# Patient Record
Sex: FEMALE | Race: WHITE | Employment: UNEMPLOYED | URBAN - METROPOLITAN AREA
[De-identification: names, ages, dates, MRNs, and addresses within clinical notes are randomized per-mention and may not be internally consistent; named-entity substitution may affect disease eponyms.]

---

## 2020-02-27 ENCOUNTER — OFFICE VISIT (OUTPATIENT)
Dept: ORTHOPEDIC SURGERY | Age: 75
End: 2020-02-27
Payer: MEDICARE

## 2020-02-27 VITALS — TEMPERATURE: 98.4 F | BODY MASS INDEX: 27.88 KG/M2 | HEIGHT: 60 IN | WEIGHT: 142 LBS

## 2020-02-27 PROCEDURE — 4040F PNEUMOC VAC/ADMIN/RCVD: CPT | Performed by: ORTHOPAEDIC SURGERY

## 2020-02-27 PROCEDURE — 1123F ACP DISCUSS/DSCN MKR DOCD: CPT | Performed by: ORTHOPAEDIC SURGERY

## 2020-02-27 PROCEDURE — G8484 FLU IMMUNIZE NO ADMIN: HCPCS | Performed by: ORTHOPAEDIC SURGERY

## 2020-02-27 PROCEDURE — G8417 CALC BMI ABV UP PARAM F/U: HCPCS | Performed by: ORTHOPAEDIC SURGERY

## 2020-02-27 PROCEDURE — G8400 PT W/DXA NO RESULTS DOC: HCPCS | Performed by: ORTHOPAEDIC SURGERY

## 2020-02-27 PROCEDURE — G8427 DOCREV CUR MEDS BY ELIG CLIN: HCPCS | Performed by: ORTHOPAEDIC SURGERY

## 2020-02-27 PROCEDURE — 99214 OFFICE O/P EST MOD 30 MIN: CPT | Performed by: ORTHOPAEDIC SURGERY

## 2020-02-27 PROCEDURE — 3017F COLORECTAL CA SCREEN DOC REV: CPT | Performed by: ORTHOPAEDIC SURGERY

## 2020-02-27 PROCEDURE — 1036F TOBACCO NON-USER: CPT | Performed by: ORTHOPAEDIC SURGERY

## 2020-02-27 PROCEDURE — 1090F PRES/ABSN URINE INCON ASSESS: CPT | Performed by: ORTHOPAEDIC SURGERY

## 2020-02-27 RX ORDER — LIDOCAINE 4 G/G
1 PATCH TOPICAL DAILY
COMMUNITY
End: 2020-07-24 | Stop reason: ALTCHOICE

## 2020-02-27 RX ORDER — TRAMADOL HYDROCHLORIDE 50 MG/1
50 TABLET ORAL EVERY 6 HOURS PRN
Status: ON HOLD | COMMUNITY
Start: 2011-06-27 | End: 2020-07-29 | Stop reason: HOSPADM

## 2020-02-27 RX ORDER — FAMOTIDINE 40 MG/1
40 TABLET, FILM COATED ORAL DAILY
COMMUNITY

## 2020-02-27 RX ORDER — EPINEPHRINE 0.3 MG/.3ML
0.3 INJECTION SUBCUTANEOUS
COMMUNITY

## 2020-02-27 RX ORDER — ROSUVASTATIN CALCIUM 10 MG/1
10 TABLET, COATED ORAL NIGHTLY
COMMUNITY
Start: 2020-02-14

## 2020-02-27 RX ORDER — OMEPRAZOLE 20 MG/1
20 CAPSULE, DELAYED RELEASE ORAL 2 TIMES DAILY
COMMUNITY

## 2020-02-27 RX ORDER — NITROFURANTOIN 25; 75 MG/1; MG/1
CAPSULE ORAL
COMMUNITY
End: 2020-07-24 | Stop reason: ALTCHOICE

## 2020-02-27 NOTE — LETTER
[facility]  Surgery Scheduling Form:  [facility]    DEMOGRAPHICS:                                                                                                                Patient Name:  Madhav Carrion  Patient :     Patient YO#:    Patient Phone:  [phone] (home)                               Patient Address:  [address] [place] [id number]    PCP:  [address] [facility] [address]:   Payor/Plan Subscr  Sex Relation Sub. Ins. ID Effective Group Num   1. MEDICARE - METerrance Macadam  Female  [id number] 1/1/15                                    [address] [facility] 1945 Female  [id number] 1/1/15 PLAN  F                                    [address]     DIAGNOSIS & PROCEDURE:                                                                                              Diagnosis:    Arthritis Left shoulder            M19.012                                                           Diagnosis Orders   1. Left shoulder pain, unspecified chronicity  XR SHOULDER LEFT (MIN 2 VIEWS)     Operation:  left reverse total shoulder replacement   [id number]  Location:  [place]  Surgeon:  Mendoza Burns. Cindy Rooney MD    SCHEDULING INFORMATION:                                                                                         .  Surgeon's Scheduling Instruction:       Requested Date:    OR Time:   Patient Arrival Time:      OR Time Required:  90 minutes  Anesthesia:  General  Equipment:  Tornier  Mini C-Arm:  No   Standard C-Arm:  No  Status:  Outpatient in bed  PAT Required:  Yes  Comments:  ALLERGIES:Bactrim [sulfamethoxazole-trimethoprim]; Gabapentin; Lipitor [atorvastatin]; Lisinopril; and Percocet [oxycodone-acetaminophen]                     Joceline Duke.  Tash Jim MD      20 11:39 AM  BILLING INFORMATION: Procedure:       CPT Code Modifier  With Jose Taylor, [address] [place] [address]                          H&P AT:    PCP  ___XX___               URGENT CARE_________       Roxy Schulte           Left   TOTAL SHOULDER REPLACEMENT                           1945                         PHYSICIANS ORDERS    HEIGHT:  Ht Readings from Last 1 Encounters:   20 5' (1.524 m)                    WEIGHT:  Wt Readings from Last 1 Encounters:   20 142 lb (64.4 kg)       ALLERGIES:Bactrim [sulfamethoxazole-trimethoprim]; Gabapentin; Lipitor [atorvastatin]; Lisinopril; and Percocet [oxycodone-acetaminophen]                                                     SUR-29                           ____________________________________________________________________  PRE-OP ORDERS:  CBC WITH DIFFERENTIAL                                                TYPE AND SCREEN                                                            HgB A1C                                                                               EKG                                                                                        NASAL CULUTRE MRSA  UAR/if positive repeat UAR on admission  BMP  Albumin and Prealbumin  VITAMIN D LEVELS  COAG PROFILE  SED RATE  PT/OT EVAL AND TEACHING  INSTRUCT PT TO STOP ALL NSAIDS, ASPIRIN, BLOOD THINNERS 7 DAYS PRIOR SURGERY  DAY OF SURGERY  CEFAZOLIN 1 GM IVPB; IF PATIENT WEIGHS > 80 KG AND SERUM CREATININE <2.5 mg/dl, GIVE 2 GM DOSE WITHIN 1 HOUR OF INCISION.   IF THE PRE-OP NASAL CULTURE FOR MRSA WAS POSITIVE:   REPEAT NASAL SWAB ON ADMISSION AND ADMINISTER VANCOMYCIN 2 GM IVPB, REDUCE THE DOSE OF VANCOMYCIN  MG IVPB IF PT < 55 KG OR SERUM CREATININE > 2mg/dl;also to get Cefazolin 2 GM or wt based  All patients will receive preop Cefazolin 2 GM or wt based  CELEBREX  200 MG  ORALLY  DAY OF SURGERY  Roxicodone 10MG  ORALLY DAY OF SURGERY          OTHER ORDERS:     PHYSICIAN SIGNATURE:   20 11:39 AM

## 2020-02-28 ENCOUNTER — TELEPHONE (OUTPATIENT)
Dept: ORTHOPEDIC SURGERY | Age: 75
End: 2020-02-28

## 2020-03-07 NOTE — PROGRESS NOTES
This dictation was done with [employer] dictation and may contain mechanical errors related to translation. Temperature 98.4 °F (36.9 °C), height 5' (1.524 m), weight 142 lb (64.4 kg), not currently breastfeeding.
Historical Provider, MD   ticagrelor (BRILINTA) 90 MG TABS tablet Take by mouth Yes Historical Provider, MD   traMADol (ULTRAM) 50 MG tablet tramadol 50 mg tablet   1-2 tablets every 6 hrs Yes Historical Provider, MD   KLOR-CON M20 20 MEQ tablet Take 20 mEq by mouth daily  Yes Historical Provider, MD   metoprolol (LOPRESSOR) 100 MG tablet Take 100 mg by mouth 2 times daily  Yes Historical Provider, MD   cephALEXin (KEFLEX) 500 MG capsule Take 500 mg by mouth daily Frequent UTI Yes Historical Provider, MD   levothyroxine (SYNTHROID) 25 MCG tablet Take 25 mcg by mouth daily  Yes Historical Provider, MD   ursodiol (ACTIGALL) 300 MG capsule Take 300 mg by mouth daily (before lunch)  Yes Historical Provider, MD   zolpidem (AMBIEN) 10 MG tablet Take by mouth nightly  Yes Historical Provider, MD   penicillin v potassium (VEETID) 500 MG tablet Take 500 mg by mouth 4 times daily Yes Historical Provider, MD   ferrous sulfate 325 (65 FE) MG tablet Take 325 mg by mouth 2 times daily  Yes Historical Provider, MD   Cholecalciferol (VITAMIN D) 2000 UNITS CAPS capsule Take by mouth daily  Yes Historical Provider, MD   Multiple Vitamins-Minerals (MULTIVITAMIN PO) Take by mouth Yes Historical Provider, MD   Multiple Vitamins-Minerals (PRORENAL QD PO) Take by mouth Yes Historical Provider, MD   B Complex-C (SUPER B COMPLEX/VITAMIN C PO) Take by mouth Yes Historical Provider, MD   Ascorbic Acid (VITAMIN C) 500 MG CAPS Take by mouth every other day  Yes Historical Provider, MD   hydrALAZINE (APRESOLINE) 25 MG tablet Take 25 mg by mouth 2 times daily   Historical Provider, MD   losartan (COZAAR) 25 MG tablet Take 25 mg by mouth nightly   Historical Provider, MD   prasugrel (EFFIENT) 10 MG TABS Take 10 mg by mouth daily  Historical Provider, MD   chlorthalidone (HYGROTON) 25 MG tablet Take 25 mg by mouth daily  Historical Provider, MD   docusate sodium (COLACE) 100 MG capsule Take 100 mg by mouth 2 times daily  Historical Provider, MD

## 2020-04-01 ENCOUNTER — TELEPHONE (OUTPATIENT)
Dept: ORTHOPEDIC SURGERY | Age: 75
End: 2020-04-01

## 2020-06-09 ENCOUNTER — TELEPHONE (OUTPATIENT)
Dept: ORTHOPEDIC SURGERY | Age: 75
End: 2020-06-09

## 2020-06-23 ENCOUNTER — PREP FOR PROCEDURE (OUTPATIENT)
Dept: ORTHOPEDIC SURGERY | Age: 75
End: 2020-06-23

## 2020-07-01 ENCOUNTER — HOSPITAL ENCOUNTER (OUTPATIENT)
Dept: CT IMAGING | Age: 75
Discharge: HOME | End: 2020-07-01
Payer: MEDICARE

## 2020-07-01 PROCEDURE — 73200 CT UPPER EXTREMITY W/O DYE: CPT

## 2020-07-21 ENCOUNTER — PREP FOR PROCEDURE (OUTPATIENT)
Dept: ORTHOPEDICS UNIT | Age: 75
End: 2020-07-21

## 2020-07-21 RX ORDER — CELECOXIB 200 MG/1
200 CAPSULE ORAL ONCE
Status: CANCELLED | OUTPATIENT
Start: 2020-07-21 | End: 2020-07-21

## 2020-07-21 NOTE — Clinical Note
Results for Jakob Callahan (MRN [id number]) as of 7/27/2020 15:37    7/23/2020 12:29  Vit D, 25-Hydroxy: 190.4 (H)

## 2020-07-22 RX ORDER — OXYCODONE HYDROCHLORIDE 5 MG/1
10 TABLET ORAL ONCE
Status: CANCELLED | OUTPATIENT
Start: 2020-07-22 | End: 2020-07-22

## 2020-07-23 ENCOUNTER — OFFICE VISIT (OUTPATIENT)
Dept: PRIMARY CARE CLINIC | Age: 75
End: 2020-07-23
Payer: MEDICARE

## 2020-07-23 ENCOUNTER — OFFICE VISIT (OUTPATIENT)
Dept: ORTHOPEDIC SURGERY | Age: 75
End: 2020-07-23
Payer: MEDICARE

## 2020-07-23 VITALS — WEIGHT: 140 LBS | TEMPERATURE: 98.2 F | HEIGHT: 60 IN | BODY MASS INDEX: 27.48 KG/M2

## 2020-07-23 DIAGNOSIS — M19.012 ARTHRITIS OF LEFT SHOULDER REGION: ICD-10-CM

## 2020-07-23 LAB
ABO/RH: NORMAL
ALBUMIN SERPL-MCNC: 4.3 G/DL (ref 3.4–5)
ANION GAP SERPL CALCULATED.3IONS-SCNC: 11 MMOL/L (ref 3–16)
ANTIBODY SCREEN: NORMAL
APTT: 26.8 SEC (ref 24.2–36.2)
BASOPHILS ABSOLUTE: 0.1 K/UL (ref 0–0.2)
BASOPHILS RELATIVE PERCENT: 1.1 %
BUN BLDV-MCNC: 26 MG/DL (ref 7–20)
CALCIUM SERPL-MCNC: 9.5 MG/DL (ref 8.3–10.6)
CHLORIDE BLD-SCNC: 104 MMOL/L (ref 99–110)
CO2: 24 MMOL/L (ref 21–32)
CREAT SERPL-MCNC: 1.2 MG/DL (ref 0.6–1.2)
EOSINOPHILS ABSOLUTE: 0.2 K/UL (ref 0–0.6)
EOSINOPHILS RELATIVE PERCENT: 3.9 %
ESTIMATED AVERAGE GLUCOSE: 111.2 MG/DL
GFR AFRICAN AMERICAN: 53
GFR NON-AFRICAN AMERICAN: 44
GLUCOSE BLD-MCNC: 92 MG/DL (ref 70–99)
HBA1C MFR BLD: 5.5 %
HCT VFR BLD CALC: 35.1 % (ref 36–48)
HEMOGLOBIN: 12 G/DL (ref 12–16)
INR BLD: 0.92 (ref 0.86–1.14)
LYMPHOCYTES ABSOLUTE: 1.3 K/UL (ref 1–5.1)
LYMPHOCYTES RELATIVE PERCENT: 20.6 %
MCH RBC QN AUTO: 34.2 PG (ref 26–34)
MCHC RBC AUTO-ENTMCNC: 34.1 G/DL (ref 31–36)
MCV RBC AUTO: 100 FL (ref 80–100)
MONOCYTES ABSOLUTE: 0.5 K/UL (ref 0–1.3)
MONOCYTES RELATIVE PERCENT: 8.6 %
NEUTROPHILS ABSOLUTE: 4.2 K/UL (ref 1.7–7.7)
NEUTROPHILS RELATIVE PERCENT: 65.8 %
PDW BLD-RTO: 13.5 % (ref 12.4–15.4)
PLATELET # BLD: 180 K/UL (ref 135–450)
PMV BLD AUTO: 9.5 FL (ref 5–10.5)
POTASSIUM SERPL-SCNC: 5.1 MMOL/L (ref 3.5–5.1)
PREALBUMIN: 26.5 MG/DL (ref 20–40)
PROTHROMBIN TIME: 10.7 SEC (ref 10–13.2)
RBC # BLD: 3.51 M/UL (ref 4–5.2)
SODIUM BLD-SCNC: 139 MMOL/L (ref 136–145)
VITAMIN D 25-HYDROXY: 190.4 NG/ML
WBC # BLD: 6.4 K/UL (ref 4–11)

## 2020-07-23 PROCEDURE — 1090F PRES/ABSN URINE INCON ASSESS: CPT | Performed by: ORTHOPAEDIC SURGERY

## 2020-07-23 PROCEDURE — G8400 PT W/DXA NO RESULTS DOC: HCPCS | Performed by: ORTHOPAEDIC SURGERY

## 2020-07-23 PROCEDURE — 99211 OFF/OP EST MAY X REQ PHY/QHP: CPT | Performed by: NURSE PRACTITIONER

## 2020-07-23 PROCEDURE — G8428 CUR MEDS NOT DOCUMENT: HCPCS | Performed by: NURSE PRACTITIONER

## 2020-07-23 PROCEDURE — 1036F TOBACCO NON-USER: CPT | Performed by: ORTHOPAEDIC SURGERY

## 2020-07-23 PROCEDURE — 99214 OFFICE O/P EST MOD 30 MIN: CPT | Performed by: ORTHOPAEDIC SURGERY

## 2020-07-23 PROCEDURE — G8417 CALC BMI ABV UP PARAM F/U: HCPCS | Performed by: NURSE PRACTITIONER

## 2020-07-23 PROCEDURE — 4040F PNEUMOC VAC/ADMIN/RCVD: CPT | Performed by: ORTHOPAEDIC SURGERY

## 2020-07-23 PROCEDURE — G8417 CALC BMI ABV UP PARAM F/U: HCPCS | Performed by: ORTHOPAEDIC SURGERY

## 2020-07-23 PROCEDURE — 1123F ACP DISCUSS/DSCN MKR DOCD: CPT | Performed by: ORTHOPAEDIC SURGERY

## 2020-07-23 PROCEDURE — 3017F COLORECTAL CA SCREEN DOC REV: CPT | Performed by: ORTHOPAEDIC SURGERY

## 2020-07-23 PROCEDURE — G8427 DOCREV CUR MEDS BY ELIG CLIN: HCPCS | Performed by: ORTHOPAEDIC SURGERY

## 2020-07-23 NOTE — PROGRESS NOTES
Patient presented to [facility] drive up clinic for preop testing. Patient was swabbed and given information advising them to remain isolated until procedure date.

## 2020-07-23 NOTE — PROGRESS NOTES
This dictation was done with [employer] dictation and may contain mechanical errors related to translation. Temperature 98.2 °F (36.8 °C), height 5' (1.524 m), weight 140 lb (63.5 kg), not currently breastfeeding.

## 2020-07-24 ENCOUNTER — TELEPHONE (OUTPATIENT)
Dept: ORTHOPEDIC SURGERY | Age: 75
End: 2020-07-24

## 2020-07-24 LAB
SARS-COV-2: NOT DETECTED
SEDIMENTATION RATE, ERYTHROCYTE: 26 MM/HR (ref 0–30)
SOURCE: NORMAL

## 2020-07-24 RX ORDER — RANOLAZINE 500 MG/1
500 TABLET, EXTENDED RELEASE ORAL 2 TIMES DAILY
COMMUNITY
Start: 2020-05-20

## 2020-07-24 RX ORDER — KETOTIFEN FUMARATE 0.35 MG/ML
1 SOLUTION/ DROPS OPHTHALMIC PRN
COMMUNITY

## 2020-07-24 RX ORDER — CHLORHEXIDINE GLUCONATE 4 G/100ML
SOLUTION TOPICAL
Qty: 1 BOTTLE | Refills: 0 | Status: SHIPPED | OUTPATIENT
Start: 2020-07-24 | End: 2020-08-07

## 2020-07-24 NOTE — PROGRESS NOTES
DR. Sim Bowels OFFICE NOTIFIED THAT PATIENT WAS NEVER GIVEN HIBICLENS TO DO PRIOR TO HER SURGERY 3 DAYS ON OPERATIVE SITE

## 2020-07-24 NOTE — PROGRESS NOTES
Preoperative Screening for Elective Surgery/Invasive Procedures While COVID-19 present in the community     Have you tested positive or have been told to self-isolate for COVID-19 like symptoms within the past 28 days? NO   Do you currently have any of the following symptoms? o Fever >100.0 F or 99.9 F in immunocompromised patients? NO  o New onset cough, shortness of breath or difficulty breathing? NO  o New onset sore throat, myalgia (muscle aches and pains), headache, loss of taste/smell or diarrhea? NO   Have you had a potential exposure to COVID-19 within the past 14 days by:  o Close contact with a confirmed case? NO  o Close contact with a healthcare worker,  or essential infrastructure worker (grocery store, [employer], gas station, public utilities or transportation)? YES-[facility]  o Do you reside in a congregate setting such as; skilled nursing facility, adult home, correctional facility, homeless shelter or other institutional setting? NO  o Have you had recent travel to a known COVID-19 hotspot? Indicate if the patient has a positive screen by answering yes to one or more of the above questions. Patients who test positive or screen positive prior to surgery or on the day of surgery should be evaluated in conjunction with the surgeon/proceduralist/anesthesiologist to determine the urgency of the procedure.

## 2020-07-24 NOTE — PROGRESS NOTES
DR. Abhinav Negro OFFICE NOTIFIED OF LABs not done on 7/23/2020 [facility] AND SED RATE

## 2020-07-24 NOTE — PROGRESS NOTES
PRE-OP INSTRUCTIONS     · Do not eat or drink anything after 12:00 midnight prior to surgery. This includes water, chewing gum, mints and ice chips. You may brush your teeth and gargle the morning of surgery but DO  NOT SWALLOW THE WATER. Take the following medications with a small sip of water on the morning of surgery: Follow your MD/Surgeons pre-procedure instructions regarding your medications    · If you use an inhaler, please use it the morning of surgery and bring with you to hospital.    · You may be asked to stop blood thinners such as:  Coumadin, Plavix, Fragmin and lovenox. Please check with your doctor before stopping these or any other medications. · Aspirin, ibuprofen, advil and naproxen, any anti-inflammatory products should be stopped for a week prior to your surgery. MAY TAKE TYLENOL    · Do not smoke and do not drink any alcoholic beverages 24 hours prior to your surgery. · Please do not wear any jewelry or body piercings on the day of surgery. · Please wear something simple, loose fitting clothing to the hospital.  Do not wear any make-up(including eye make-up) or nail polish on your fingers and toes. · As part of our patient safety program to minimize surgical infections, we ask you to do the followin. Please notify your surgeon if you develop any illness between now                          and the day of your surgery. This includes a cough, cold, fever, sore                       throat, nausea, vomiting, diarrhea, etc.  Also please notify your                                  surgeon if you experience dizziness, shortness of breath or                       Blurred vision between now and the time of your surgery.                    2.  Please notify your surgeon of any open or redden areas that may                        look infected                     3.  DO NOT shave your operative site 96 hours(four days) prior to

## 2020-07-27 NOTE — PROGRESS NOTES
Patient is a 69-year-old female who is here for preoperative discussion of left reverse total shoulder arthroplasty. Date of surgery scheduled is 7/29/2020. This patient is about 3 and half years status post contralateral right reverse total shoulder arthroplasty and has done very well with this. Her left shoulder now has significant pain loss of range of motion and x-ray and other advanced imaging shows degenerative arthritis of the glenohumeral joint along with chronic rotator cuff tear. Patient has no history of injury or surgery to the left shoulder. The patient is not diabetic and is not taking any hormone replacement medications. Patient is not allergic to metal has no neck problems but does have a family history of degenerative arthritis. Patient does have a history of 2 strokes and is currently on a blood thinner however this patient is stated that she has stopped that for her surgical procedure. Patient does not smoke however she does take tramadol which we did suggest that she cut back or eliminate that at least preoperatively. Patient is here to discuss shoulder replacement due to high degree of pain and loss of range of motion of the left shoulder. Patient Active Problem List   Diagnosis    Arthritis of right shoulder region    Biceps tendonitis on right    S/p reverse total shoulder arthroplasty     Prior to Visit Medications    Medication Sig Taking?  Authorizing Provider   aspirin 81 MG tablet Take 81 mg by mouth daily Yes Historical Provider, MD   omeprazole (PRILOSEC) 20 MG delayed release capsule Take 20 mg by mouth 2 times daily  Yes Historical Provider, MD   famotidine (PEPCID) 40 MG tablet Take 40 mg by mouth daily Yes Historical Provider, MD   EPINEPHrine (EPIPEN) 0.3 MG/0.3ML SOAJ injection Inject 0.3 mg into the muscle Yes Historical Provider, MD   rosuvastatin (CRESTOR) 10 MG tablet Take 10 mg by mouth nightly  Yes Historical Provider, MD   ticagrelor (BRILINTA) 90 MG TABS tablet Take 60 mg by mouth 2 times daily  Yes Historical Provider, MD   traMADol (ULTRAM) 50 MG tablet Take 50 mg by mouth every 6 hours as needed.   Yes Historical Provider, MD   KLOR-CON M20 20 MEQ tablet Take 20 mEq by mouth daily  Yes Historical Provider, MD   metoprolol (LOPRESSOR) 100 MG tablet Take 100 mg by mouth 2 times daily  Yes Historical Provider, MD   cephALEXin (KEFLEX) 500 MG capsule Take 500 mg by mouth daily Frequent UTI Yes Historical Provider, MD   hydrALAZINE (APRESOLINE) 25 MG tablet Take 25 mg by mouth 2 times daily  Yes Historical Provider, MD   levothyroxine (SYNTHROID) 25 MCG tablet Take 25 mcg by mouth daily  Yes Historical Provider, MD   losartan (COZAAR) 25 MG tablet Take 25 mg by mouth nightly  Yes Historical Provider, MD   ursodiol (ACTIGALL) 300 MG capsule Take 300 mg by mouth 3 times daily (before meals)  Yes Historical Provider, MD   zolpidem (AMBIEN) 10 MG tablet Take by mouth nightly  Yes Historical Provider, MD   penicillin v potassium (VEETID) 500 MG tablet Take 500 mg by mouth 4 times daily Yes Historical Provider, MD   ferrous sulfate 325 (65 FE) MG tablet Take 325 mg by mouth 2 times daily  Yes Historical Provider, MD   Cholecalciferol (VITAMIN D) 2000 UNITS CAPS capsule Take by mouth daily  Yes Historical Provider, MD   Biotin (BIOTIN MAXIMUM STRENGTH) 10 MG tablet Take 10 mg by mouth daily Yes Historical Provider, MD   Multiple Vitamins-Minerals (PRORENAL QD PO) Take by mouth Yes Historical Provider, MD   calcium carbonate (OSCAL) 500 MG TABS tablet Take 500 mg by mouth daily  Yes Historical Provider, MD   B Complex-C (SUPER B COMPLEX/VITAMIN C PO) Take 1 tablet by mouth  Yes Historical Provider, MD   Ascorbic Acid (VITAMIN C) 500 MG CAPS Take 1 tablet by mouth daily  Yes Historical Provider, MD   ranolazine (RANEXA) 500 MG extended release tablet Take 500 mg by mouth 2 times daily  Historical Provider, MD   ketotifen (THERATEARS ALLERGY) 0.025 % ophthalmic solution Place 1 drop into both eyes as needed [address] [facility] Provider, MD   Multiple Vitamins-Minerals (PRORENAL QD PO) Take 1 tablet by mouth daily  Historical Provider, MD   chlorhexidine (HIBICLENS) 4 % external liquid Apply topically daily as needed for 5 days. Peg Renteria MD     Physical examination 76-year-old female oriented x3 temperature is 98.2. Examination of her neck shows good range of motion no specific pain tenderness or instability. Motor exam to the left upper extremity shows trapezius deltoid biceps triceps wrist extensors and flexors and hand intrinsics are intact 4-5 over 5. Sensation and perfusion are intact of the left hand and fingers. There is no numbness or tingling noted in the left upper extremity. Axillary motor and sensory intact left upper extremity. No other obvious cutaneous lesions lymphedema or cellulitic processes are noted in the left upper extremity. Examination of the left shoulder shows pain tenderness over the anterior lateral aspect of the acromion and anterior biceps region. Loss of range of motion she has about 70 degrees of abduction and about 45 degrees of forward flexion before she has pain. There are no obvious signs of instability or deep sepsis noted in the left shoulder. Impression 76-year-old female with left shoulder rotator cuff arthropathy. We had a 35 minute face-to-face discussion of which greater than 50% of the time was spent in counseling with this patient concerning left reverse total shoulder arthroplasty it's preoperative evaluation and its postoperative pain management and follow-up. We went over the surgical procedure risks and complications including bleeding, infection,  neurovascular injury, decreased range of motion, persistent pain, instability with possible dislocation or fracture, DVT, pulmonary embolism, arm length inequality, change in mental status, and need for further surgical procedures.   The patient understands this and we have answered all of their questions and concerns. The patient was counseled at length about the risks of axel Covid-19 during their perioperative period and any recovery window from their procedure. The patient was made aware that axel Covid-19  may worsen their prognosis for recovering from their procedure  and lend to a higher morbidity and/or mortality risk. All material risks, benefits, and reasonable alternatives including postponing the procedure were discussed. The patient does wish to proceed with the procedure at this time.

## 2020-07-27 NOTE — PROGRESS NOTES
SPOKE WITH BRIANNE ROYAL AND SHE IS AWARE UAR AND MRSA NEED TO BE DONE DOS AND SHE STATES SHE WILL PUT ORDERS IN PER DR. Violet Grimes

## 2020-07-28 ENCOUNTER — ANESTHESIA EVENT (OUTPATIENT)
Dept: OPERATING ROOM | Age: 75
DRG: 483 | End: 2020-07-28
Payer: MEDICARE

## 2020-07-28 NOTE — PROGRESS NOTES
Spoke to Beverly Yañez MA at ortho office regarding ekg needing completed preop, per Dr Uri Mccollum to have done preop morning of surgery, order placed.  Electronically signed by Coleman Wright RN on 7/28/2020 at 12:39 PM

## 2020-07-29 ENCOUNTER — APPOINTMENT (OUTPATIENT)
Dept: GENERAL RADIOLOGY | Age: 75
DRG: 483 | End: 2020-07-29
Attending: ORTHOPAEDIC SURGERY
Payer: MEDICARE

## 2020-07-29 ENCOUNTER — HOSPITAL ENCOUNTER (INPATIENT)
Age: 75
LOS: 1 days | Discharge: HOME | DRG: 483 | End: 2020-07-30
Attending: ORTHOPAEDIC SURGERY | Admitting: ORTHOPAEDIC SURGERY
Payer: MEDICARE

## 2020-07-29 ENCOUNTER — ANESTHESIA (OUTPATIENT)
Dept: OPERATING ROOM | Age: 75
DRG: 483 | End: 2020-07-29
Payer: MEDICARE

## 2020-07-29 VITALS
TEMPERATURE: 97.7 F | RESPIRATION RATE: 10 BRPM | DIASTOLIC BLOOD PRESSURE: 61 MMHG | SYSTOLIC BLOOD PRESSURE: 106 MMHG | OXYGEN SATURATION: 100 %

## 2020-07-29 PROBLEM — M13.812 OTHER SPECIFIED ARTHRITIS, LEFT SHOULDER: Status: ACTIVE | Noted: 2020-07-29

## 2020-07-29 LAB
ABO/RH: NORMAL
ANTIBODY SCREEN: NORMAL
BILIRUBIN URINE: NEGATIVE
BLOOD, URINE: NEGATIVE
CLARITY: CLEAR
COLOR: YELLOW
EKG ATRIAL RATE: 59 BPM
EKG DIAGNOSIS: NORMAL
EKG P AXIS: 57 DEGREES
EKG P-R INTERVAL: 156 MS
EKG Q-T INTERVAL: 448 MS
EKG QRS DURATION: 106 MS
EKG QTC CALCULATION (BAZETT): 443 MS
EKG R AXIS: -20 DEGREES
EKG T AXIS: 19 DEGREES
EKG VENTRICULAR RATE: 59 BPM
EPITHELIAL CELLS, UA: NORMAL /HPF (ref 0–5)
FINE CASTS, UA: NORMAL /LPF (ref 0–2)
GLUCOSE BLD-MCNC: 111 MG/DL (ref 70–99)
GLUCOSE BLD-MCNC: 144 MG/DL (ref 70–99)
GLUCOSE BLD-MCNC: 233 MG/DL (ref 70–99)
GLUCOSE URINE: NEGATIVE MG/DL
KETONES, URINE: NEGATIVE MG/DL
LEUKOCYTE ESTERASE, URINE: ABNORMAL
MICROSCOPIC EXAMINATION: YES
NITRITE, URINE: NEGATIVE
PERFORMED ON: ABNORMAL
PH UA: 6.5 (ref 5–8)
PROTEIN UA: ABNORMAL MG/DL
RBC UA: NORMAL /HPF (ref 0–4)
SPECIFIC GRAVITY UA: 1.01 (ref 1–1.03)
URINE REFLEX TO CULTURE: ABNORMAL
URINE TYPE: ABNORMAL
UROBILINOGEN, URINE: 0.2 E.U./DL
WBC UA: NORMAL /HPF (ref 0–5)

## 2020-07-29 PROCEDURE — 6360000002 HC RX W HCPCS: Performed by: ORTHOPAEDIC SURGERY

## 2020-07-29 PROCEDURE — 86900 BLOOD TYPING SEROLOGIC ABO: CPT

## 2020-07-29 PROCEDURE — 97166 OT EVAL MOD COMPLEX 45 MIN: CPT

## 2020-07-29 PROCEDURE — 2580000003 HC RX 258: Performed by: ANESTHESIOLOGY

## 2020-07-29 PROCEDURE — 94760 N-INVAS EAR/PLS OXIMETRY 1: CPT

## 2020-07-29 PROCEDURE — 6360000002 HC RX W HCPCS: Performed by: NURSE ANESTHETIST, CERTIFIED REGISTERED

## 2020-07-29 PROCEDURE — 0LS40ZZ REPOSITION LEFT UPPER ARM TENDON, OPEN APPROACH: ICD-10-PCS | Performed by: ORTHOPAEDIC SURGERY

## 2020-07-29 PROCEDURE — 6360000002 HC RX W HCPCS: Performed by: ANESTHESIOLOGY

## 2020-07-29 PROCEDURE — 87641 MR-STAPH DNA AMP PROBE: CPT

## 2020-07-29 PROCEDURE — 3600000005 HC SURGERY LEVEL 5 BASE: Performed by: ORTHOPAEDIC SURGERY

## 2020-07-29 PROCEDURE — 2500000003 HC RX 250 WO HCPCS: Performed by: NURSE ANESTHETIST, CERTIFIED REGISTERED

## 2020-07-29 PROCEDURE — 2580000003 HC RX 258: Performed by: ORTHOPAEDIC SURGERY

## 2020-07-29 PROCEDURE — C1776 JOINT DEVICE (IMPLANTABLE): HCPCS | Performed by: ORTHOPAEDIC SURGERY

## 2020-07-29 PROCEDURE — 0RRK00Z REPLACEMENT OF LEFT SHOULDER JOINT WITH REVERSE BALL AND SOCKET SYNTHETIC SUBSTITUTE, OPEN APPROACH: ICD-10-PCS | Performed by: ORTHOPAEDIC SURGERY

## 2020-07-29 PROCEDURE — 97535 SELF CARE MNGMENT TRAINING: CPT

## 2020-07-29 PROCEDURE — 73020 X-RAY EXAM OF SHOULDER: CPT

## 2020-07-29 PROCEDURE — 1200000000 HC SEMI PRIVATE

## 2020-07-29 PROCEDURE — 2709999900 HC NON-CHARGEABLE SUPPLY: Performed by: ORTHOPAEDIC SURGERY

## 2020-07-29 PROCEDURE — 3700000001 HC ADD 15 MINUTES (ANESTHESIA): Performed by: ORTHOPAEDIC SURGERY

## 2020-07-29 PROCEDURE — 2700000000 HC OXYGEN THERAPY PER DAY

## 2020-07-29 PROCEDURE — 86850 RBC ANTIBODY SCREEN: CPT

## 2020-07-29 PROCEDURE — 6370000000 HC RX 637 (ALT 250 FOR IP): Performed by: ORTHOPAEDIC SURGERY

## 2020-07-29 PROCEDURE — 3600000015 HC SURGERY LEVEL 5 ADDTL 15MIN: Performed by: ORTHOPAEDIC SURGERY

## 2020-07-29 PROCEDURE — 6370000000 HC RX 637 (ALT 250 FOR IP): Performed by: NURSE PRACTITIONER

## 2020-07-29 PROCEDURE — 88304 TISSUE EXAM BY PATHOLOGIST: CPT

## 2020-07-29 PROCEDURE — 93005 ELECTROCARDIOGRAM TRACING: CPT | Performed by: ORTHOPAEDIC SURGERY

## 2020-07-29 PROCEDURE — 3700000000 HC ANESTHESIA ATTENDED CARE: Performed by: ORTHOPAEDIC SURGERY

## 2020-07-29 PROCEDURE — 2500000003 HC RX 250 WO HCPCS: Performed by: ORTHOPAEDIC SURGERY

## 2020-07-29 PROCEDURE — 81001 URINALYSIS AUTO W/SCOPE: CPT

## 2020-07-29 PROCEDURE — 97530 THERAPEUTIC ACTIVITIES: CPT

## 2020-07-29 PROCEDURE — 3209999900 FLUORO FOR SURGICAL PROCEDURES

## 2020-07-29 PROCEDURE — 86901 BLOOD TYPING SEROLOGIC RH(D): CPT

## 2020-07-29 PROCEDURE — 7100000001 HC PACU RECOVERY - ADDTL 15 MIN: Performed by: ORTHOPAEDIC SURGERY

## 2020-07-29 PROCEDURE — 7100000000 HC PACU RECOVERY - FIRST 15 MIN: Performed by: ORTHOPAEDIC SURGERY

## 2020-07-29 PROCEDURE — 93010 ELECTROCARDIOGRAM REPORT: CPT | Performed by: INTERNAL MEDICINE

## 2020-07-29 PROCEDURE — 88311 DECALCIFY TISSUE: CPT

## 2020-07-29 DEVICE — INSERT HUM DIA36MM THK+6MM B-12.5DEG SHLDR REVERSED: Type: IMPLANTABLE DEVICE | Site: SHOULDER | Status: FUNCTIONAL

## 2020-07-29 DEVICE — IMPLANTABLE DEVICE: Type: IMPLANTABLE DEVICE | Site: SHOULDER | Status: FUNCTIONAL

## 2020-07-29 DEVICE — SPHERE GLEN DIA36MM STD REVERSED AEQUALIS PERFORM: Type: IMPLANTABLE DEVICE | Site: SHOULDER | Status: FUNCTIONAL

## 2020-07-29 DEVICE — TRAY HUM THK+0MM 3.5MM OFFSET SHLDR HI REVERSED AEQUALIS: Type: IMPLANTABLE DEVICE | Site: SHOULDER | Status: FUNCTIONAL

## 2020-07-29 DEVICE — SCREW BONE L30MM DIA6.5MM TI ST FULL THRD CTRL FOR GLEN: Type: IMPLANTABLE DEVICE | Site: SHOULDER | Status: FUNCTIONAL

## 2020-07-29 DEVICE — BASEPLATE GLEN DIA25MM STD REVERSED AEQUALIS PERFORM: Type: IMPLANTABLE DEVICE | Site: SHOULDER | Status: FUNCTIONAL

## 2020-07-29 DEVICE — SCREW BONE L30MM DIA5MM TI ST FULL THRD PERIPH FOR GLEN: Type: IMPLANTABLE DEVICE | Site: SHOULDER | Status: FUNCTIONAL

## 2020-07-29 RX ORDER — GLYCOPYRROLATE 0.2 MG/ML
INJECTION INTRAMUSCULAR; INTRAVENOUS PRN
Status: DISCONTINUED | OUTPATIENT
Start: 2020-07-29 | End: 2020-07-29 | Stop reason: SDUPTHER

## 2020-07-29 RX ORDER — DEXTROSE MONOHYDRATE 50 MG/ML
100 INJECTION, SOLUTION INTRAVENOUS PRN
Status: DISCONTINUED | OUTPATIENT
Start: 2020-07-29 | End: 2020-07-30 | Stop reason: HOSPADM

## 2020-07-29 RX ORDER — SODIUM CHLORIDE 0.9 % (FLUSH) 0.9 %
10 SYRINGE (ML) INJECTION EVERY 12 HOURS SCHEDULED
Status: DISCONTINUED | OUTPATIENT
Start: 2020-07-29 | End: 2020-07-29 | Stop reason: HOSPADM

## 2020-07-29 RX ORDER — DIPHENHYDRAMINE HYDROCHLORIDE 50 MG/ML
6.25 INJECTION INTRAMUSCULAR; INTRAVENOUS ONCE
Status: COMPLETED | OUTPATIENT
Start: 2020-07-29 | End: 2020-07-29

## 2020-07-29 RX ORDER — FERROUS SULFATE TAB EC 324 MG (65 MG FE EQUIVALENT) 324 (65 FE) MG
324 TABLET DELAYED RESPONSE ORAL 2 TIMES DAILY WITH MEALS
Status: DISCONTINUED | OUTPATIENT
Start: 2020-07-29 | End: 2020-07-30 | Stop reason: HOSPADM

## 2020-07-29 RX ORDER — PROMETHAZINE HYDROCHLORIDE 25 MG/ML
6.25 INJECTION, SOLUTION INTRAMUSCULAR; INTRAVENOUS
Status: DISCONTINUED | OUTPATIENT
Start: 2020-07-29 | End: 2020-07-29 | Stop reason: HOSPADM

## 2020-07-29 RX ORDER — NICOTINE POLACRILEX 4 MG
15 LOZENGE BUCCAL PRN
Status: DISCONTINUED | OUTPATIENT
Start: 2020-07-29 | End: 2020-07-30 | Stop reason: HOSPADM

## 2020-07-29 RX ORDER — DEXAMETHASONE SODIUM PHOSPHATE 4 MG/ML
INJECTION, SOLUTION INTRA-ARTICULAR; INTRALESIONAL; INTRAMUSCULAR; INTRAVENOUS; SOFT TISSUE PRN
Status: DISCONTINUED | OUTPATIENT
Start: 2020-07-29 | End: 2020-07-29 | Stop reason: SDUPTHER

## 2020-07-29 RX ORDER — LOSARTAN POTASSIUM 25 MG/1
25 TABLET ORAL NIGHTLY
Status: DISCONTINUED | OUTPATIENT
Start: 2020-07-29 | End: 2020-07-30 | Stop reason: HOSPADM

## 2020-07-29 RX ORDER — KETOTIFEN FUMARATE 0.35 MG/ML
1 SOLUTION/ DROPS OPHTHALMIC 2 TIMES DAILY
Status: DISCONTINUED | OUTPATIENT
Start: 2020-07-29 | End: 2020-07-30 | Stop reason: HOSPADM

## 2020-07-29 RX ORDER — POTASSIUM CHLORIDE 20 MEQ/1
20 TABLET, EXTENDED RELEASE ORAL DAILY
Status: DISCONTINUED | OUTPATIENT
Start: 2020-07-29 | End: 2020-07-30 | Stop reason: HOSPADM

## 2020-07-29 RX ORDER — FENTANYL CITRATE 50 UG/ML
INJECTION, SOLUTION INTRAMUSCULAR; INTRAVENOUS PRN
Status: DISCONTINUED | OUTPATIENT
Start: 2020-07-29 | End: 2020-07-29 | Stop reason: SDUPTHER

## 2020-07-29 RX ORDER — ROCURONIUM BROMIDE 10 MG/ML
INJECTION, SOLUTION INTRAVENOUS PRN
Status: DISCONTINUED | OUTPATIENT
Start: 2020-07-29 | End: 2020-07-29 | Stop reason: SDUPTHER

## 2020-07-29 RX ORDER — METOPROLOL TARTRATE 50 MG/1
100 TABLET, FILM COATED ORAL 2 TIMES DAILY
Status: DISCONTINUED | OUTPATIENT
Start: 2020-07-29 | End: 2020-07-30 | Stop reason: HOSPADM

## 2020-07-29 RX ORDER — LEVOTHYROXINE SODIUM 0.03 MG/1
25 TABLET ORAL
Status: DISCONTINUED | OUTPATIENT
Start: 2020-07-30 | End: 2020-07-30 | Stop reason: HOSPADM

## 2020-07-29 RX ORDER — ONDANSETRON 2 MG/ML
INJECTION INTRAMUSCULAR; INTRAVENOUS PRN
Status: DISCONTINUED | OUTPATIENT
Start: 2020-07-29 | End: 2020-07-29 | Stop reason: SDUPTHER

## 2020-07-29 RX ORDER — TRANEXAMIC ACID 100 MG/ML
INJECTION, SOLUTION INTRAVENOUS
Status: COMPLETED | OUTPATIENT
Start: 2020-07-29 | End: 2020-07-29

## 2020-07-29 RX ORDER — VANCOMYCIN HYDROCHLORIDE 1 G/20ML
INJECTION, POWDER, LYOPHILIZED, FOR SOLUTION INTRAVENOUS
Status: COMPLETED | OUTPATIENT
Start: 2020-07-29 | End: 2020-07-29

## 2020-07-29 RX ORDER — KETAMINE HCL IN NACL, ISO-OSM 100MG/10ML
SYRINGE (ML) INJECTION PRN
Status: DISCONTINUED | OUTPATIENT
Start: 2020-07-29 | End: 2020-07-29 | Stop reason: SDUPTHER

## 2020-07-29 RX ORDER — VANCOMYCIN HYDROCHLORIDE 1 G/20ML
INJECTION, POWDER, LYOPHILIZED, FOR SOLUTION INTRAVENOUS PRN
Status: DISCONTINUED | OUTPATIENT
Start: 2020-07-29 | End: 2020-07-29 | Stop reason: SDUPTHER

## 2020-07-29 RX ORDER — SODIUM CHLORIDE 0.9 % (FLUSH) 0.9 %
10 SYRINGE (ML) INJECTION EVERY 12 HOURS SCHEDULED
Status: DISCONTINUED | OUTPATIENT
Start: 2020-07-29 | End: 2020-07-30 | Stop reason: HOSPADM

## 2020-07-29 RX ORDER — FENTANYL CITRATE 50 UG/ML
25 INJECTION, SOLUTION INTRAMUSCULAR; INTRAVENOUS EVERY 5 MIN PRN
Status: DISCONTINUED | OUTPATIENT
Start: 2020-07-29 | End: 2020-07-29 | Stop reason: HOSPADM

## 2020-07-29 RX ORDER — SODIUM CHLORIDE 0.9 % (FLUSH) 0.9 %
10 SYRINGE (ML) INJECTION PRN
Status: DISCONTINUED | OUTPATIENT
Start: 2020-07-29 | End: 2020-07-30 | Stop reason: HOSPADM

## 2020-07-29 RX ORDER — HYDROCODONE BITARTRATE AND ACETAMINOPHEN 5; 325 MG/1; MG/1
1 TABLET ORAL EVERY 4 HOURS PRN
Status: DISCONTINUED | OUTPATIENT
Start: 2020-07-29 | End: 2020-07-30 | Stop reason: HOSPADM

## 2020-07-29 RX ORDER — EPHEDRINE SULFATE/0.9% NACL/PF 50 MG/5 ML
SYRINGE (ML) INTRAVENOUS PRN
Status: DISCONTINUED | OUTPATIENT
Start: 2020-07-29 | End: 2020-07-29 | Stop reason: SDUPTHER

## 2020-07-29 RX ORDER — MORPHINE SULFATE 2 MG/ML
2 INJECTION, SOLUTION INTRAMUSCULAR; INTRAVENOUS
Status: DISCONTINUED | OUTPATIENT
Start: 2020-07-29 | End: 2020-07-30 | Stop reason: HOSPADM

## 2020-07-29 RX ORDER — OMEPRAZOLE 20 MG/1
20 CAPSULE, DELAYED RELEASE ORAL
Status: DISCONTINUED | OUTPATIENT
Start: 2020-07-29 | End: 2020-07-30 | Stop reason: HOSPADM

## 2020-07-29 RX ORDER — ZOLPIDEM TARTRATE 5 MG/1
10 TABLET ORAL NIGHTLY PRN
Status: DISCONTINUED | OUTPATIENT
Start: 2020-07-29 | End: 2020-07-30 | Stop reason: HOSPADM

## 2020-07-29 RX ORDER — RANOLAZINE 500 MG/1
500 TABLET, EXTENDED RELEASE ORAL 2 TIMES DAILY
Status: DISCONTINUED | OUTPATIENT
Start: 2020-07-29 | End: 2020-07-30 | Stop reason: HOSPADM

## 2020-07-29 RX ORDER — SENNA AND DOCUSATE SODIUM 50; 8.6 MG/1; MG/1
1 TABLET, FILM COATED ORAL 2 TIMES DAILY
Status: DISCONTINUED | OUTPATIENT
Start: 2020-07-29 | End: 2020-07-30 | Stop reason: HOSPADM

## 2020-07-29 RX ORDER — HYDRALAZINE HYDROCHLORIDE 25 MG/1
25 TABLET, FILM COATED ORAL 2 TIMES DAILY
Status: DISCONTINUED | OUTPATIENT
Start: 2020-07-29 | End: 2020-07-30 | Stop reason: ALTCHOICE

## 2020-07-29 RX ORDER — URSODIOL 300 MG/1
300 CAPSULE ORAL
Status: DISCONTINUED | OUTPATIENT
Start: 2020-07-29 | End: 2020-07-30 | Stop reason: HOSPADM

## 2020-07-29 RX ORDER — CALCIUM CARBONATE 500(1250)
500 TABLET ORAL DAILY
Status: DISCONTINUED | OUTPATIENT
Start: 2020-07-30 | End: 2020-07-30 | Stop reason: HOSPADM

## 2020-07-29 RX ORDER — ROSUVASTATIN CALCIUM 10 MG/1
10 TABLET, COATED ORAL NIGHTLY
Status: DISCONTINUED | OUTPATIENT
Start: 2020-07-29 | End: 2020-07-30 | Stop reason: HOSPADM

## 2020-07-29 RX ORDER — FENTANYL CITRATE 50 UG/ML
25 INJECTION, SOLUTION INTRAMUSCULAR; INTRAVENOUS EVERY 5 MIN PRN
Status: COMPLETED | OUTPATIENT
Start: 2020-07-29 | End: 2020-07-29

## 2020-07-29 RX ORDER — INSULIN GLARGINE 100 [IU]/ML
0.25 INJECTION, SOLUTION SUBCUTANEOUS NIGHTLY
Status: DISCONTINUED | OUTPATIENT
Start: 2020-07-29 | End: 2020-07-30 | Stop reason: HOSPADM

## 2020-07-29 RX ORDER — ONDANSETRON 2 MG/ML
4 INJECTION INTRAMUSCULAR; INTRAVENOUS EVERY 6 HOURS PRN
Status: DISCONTINUED | OUTPATIENT
Start: 2020-07-29 | End: 2020-07-30 | Stop reason: HOSPADM

## 2020-07-29 RX ORDER — DIPHENHYDRAMINE HCL 25 MG
25 TABLET ORAL EVERY 6 HOURS PRN
Status: DISCONTINUED | OUTPATIENT
Start: 2020-07-29 | End: 2020-07-30 | Stop reason: HOSPADM

## 2020-07-29 RX ORDER — DEXTROSE MONOHYDRATE 25 G/50ML
12.5 INJECTION, SOLUTION INTRAVENOUS PRN
Status: DISCONTINUED | OUTPATIENT
Start: 2020-07-29 | End: 2020-07-30 | Stop reason: HOSPADM

## 2020-07-29 RX ORDER — SODIUM CHLORIDE 9 MG/ML
INJECTION, SOLUTION INTRAVENOUS CONTINUOUS
Status: DISCONTINUED | OUTPATIENT
Start: 2020-07-29 | End: 2020-07-29

## 2020-07-29 RX ORDER — LIDOCAINE HYDROCHLORIDE 20 MG/ML
INJECTION, SOLUTION EPIDURAL; INFILTRATION; INTRACAUDAL; PERINEURAL PRN
Status: DISCONTINUED | OUTPATIENT
Start: 2020-07-29 | End: 2020-07-29 | Stop reason: SDUPTHER

## 2020-07-29 RX ORDER — OXYCODONE HYDROCHLORIDE 10 MG/1
10 TABLET ORAL EVERY 4 HOURS PRN
Status: DISCONTINUED | OUTPATIENT
Start: 2020-07-29 | End: 2020-07-29

## 2020-07-29 RX ORDER — OXYCODONE HYDROCHLORIDE 5 MG/1
5 TABLET ORAL EVERY 4 HOURS PRN
Status: DISCONTINUED | OUTPATIENT
Start: 2020-07-29 | End: 2020-07-29

## 2020-07-29 RX ORDER — HYDROCODONE BITARTRATE AND ACETAMINOPHEN 5; 325 MG/1; MG/1
2 TABLET ORAL EVERY 4 HOURS PRN
Status: DISCONTINUED | OUTPATIENT
Start: 2020-07-29 | End: 2020-07-30 | Stop reason: HOSPADM

## 2020-07-29 RX ORDER — SODIUM CHLORIDE 0.9 % (FLUSH) 0.9 %
10 SYRINGE (ML) INJECTION PRN
Status: DISCONTINUED | OUTPATIENT
Start: 2020-07-29 | End: 2020-07-29 | Stop reason: HOSPADM

## 2020-07-29 RX ORDER — ACETAMINOPHEN 325 MG/1
650 TABLET ORAL EVERY 4 HOURS PRN
Status: DISCONTINUED | OUTPATIENT
Start: 2020-07-29 | End: 2020-07-30 | Stop reason: HOSPADM

## 2020-07-29 RX ORDER — OXYCODONE HYDROCHLORIDE 5 MG/1
10 TABLET ORAL ONCE
Status: COMPLETED | OUTPATIENT
Start: 2020-07-29 | End: 2020-07-29

## 2020-07-29 RX ORDER — PROPOFOL 10 MG/ML
INJECTION, EMULSION INTRAVENOUS PRN
Status: DISCONTINUED | OUTPATIENT
Start: 2020-07-29 | End: 2020-07-29 | Stop reason: SDUPTHER

## 2020-07-29 RX ORDER — LABETALOL HYDROCHLORIDE 5 MG/ML
5 INJECTION, SOLUTION INTRAVENOUS EVERY 10 MIN PRN
Status: DISCONTINUED | OUTPATIENT
Start: 2020-07-29 | End: 2020-07-29 | Stop reason: HOSPADM

## 2020-07-29 RX ORDER — FAMOTIDINE 20 MG/1
40 TABLET, FILM COATED ORAL DAILY
Status: DISCONTINUED | OUTPATIENT
Start: 2020-07-30 | End: 2020-07-30 | Stop reason: HOSPADM

## 2020-07-29 RX ORDER — HYDROCODONE BITARTRATE AND ACETAMINOPHEN 5; 325 MG/1; MG/1
1-2 TABLET ORAL EVERY 6 HOURS PRN
Qty: 40 TABLET | Refills: 0 | Status: SHIPPED | OUTPATIENT
Start: 2020-07-29 | End: 2020-08-12 | Stop reason: SDUPTHER

## 2020-07-29 RX ADMIN — LOSARTAN POTASSIUM 25 MG: 25 TABLET, FILM COATED ORAL at 21:42

## 2020-07-29 RX ADMIN — GLYCOPYRROLATE 0.2 MG: 0.2 INJECTION, SOLUTION INTRAMUSCULAR; INTRAVENOUS at 09:57

## 2020-07-29 RX ADMIN — ROSUVASTATIN CALCIUM 10 MG: 10 TABLET, FILM COATED ORAL at 21:42

## 2020-07-29 RX ADMIN — OXYCODONE HYDROCHLORIDE 10 MG: 10 TABLET ORAL at 13:53

## 2020-07-29 RX ADMIN — MORPHINE SULFATE 2 MG: 2 INJECTION, SOLUTION INTRAMUSCULAR; INTRAVENOUS at 18:43

## 2020-07-29 RX ADMIN — SODIUM CHLORIDE: 9 INJECTION, SOLUTION INTRAVENOUS at 07:51

## 2020-07-29 RX ADMIN — DIPHENHYDRAMINE HYDROCHLORIDE 6.25 MG: 50 INJECTION, SOLUTION INTRAMUSCULAR; INTRAVENOUS at 11:54

## 2020-07-29 RX ADMIN — FENTANYL CITRATE 25 MCG: 50 INJECTION, SOLUTION INTRAMUSCULAR; INTRAVENOUS at 12:14

## 2020-07-29 RX ADMIN — FERROUS SULFATE TAB EC 324 MG (65 MG FE EQUIVALENT) 324 MG: 324 (65 FE) TABLET DELAYED RESPONSE at 16:17

## 2020-07-29 RX ADMIN — OMEPRAZOLE 20 MG: 20 CAPSULE, DELAYED RELEASE ORAL at 16:17

## 2020-07-29 RX ADMIN — OXYCODONE 10 MG: 5 TABLET ORAL at 07:50

## 2020-07-29 RX ADMIN — METOPROLOL TARTRATE 100 MG: 50 TABLET, FILM COATED ORAL at 21:42

## 2020-07-29 RX ADMIN — POTASSIUM CHLORIDE 20 MEQ: 1500 TABLET, EXTENDED RELEASE ORAL at 13:53

## 2020-07-29 RX ADMIN — URSODIOL 300 MG: 300 CAPSULE ORAL at 18:05

## 2020-07-29 RX ADMIN — HYDROCODONE BITARTRATE AND ACETAMINOPHEN 2 TABLET: 5; 325 TABLET ORAL at 16:18

## 2020-07-29 RX ADMIN — DIPHENHYDRAMINE HYDROCHLORIDE 6.25 MG: 50 INJECTION, SOLUTION INTRAMUSCULAR; INTRAVENOUS at 12:30

## 2020-07-29 RX ADMIN — FENTANYL CITRATE 25 MCG: 50 INJECTION INTRAMUSCULAR; INTRAVENOUS at 10:55

## 2020-07-29 RX ADMIN — Medication 10 MG: at 09:17

## 2020-07-29 RX ADMIN — SODIUM CHLORIDE, PRESERVATIVE FREE 10 ML: 5 INJECTION INTRAVENOUS at 23:23

## 2020-07-29 RX ADMIN — FENTANYL CITRATE 25 MCG: 50 INJECTION, SOLUTION INTRAMUSCULAR; INTRAVENOUS at 12:25

## 2020-07-29 RX ADMIN — ROCURONIUM BROMIDE 10 MG: 10 INJECTION INTRAVENOUS at 09:29

## 2020-07-29 RX ADMIN — FENTANYL CITRATE 50 MCG: 50 INJECTION INTRAMUSCULAR; INTRAVENOUS at 09:03

## 2020-07-29 RX ADMIN — Medication 10 MG: at 09:35

## 2020-07-29 RX ADMIN — HYDRALAZINE HYDROCHLORIDE 25 MG: 25 TABLET, FILM COATED ORAL at 21:42

## 2020-07-29 RX ADMIN — INSULIN GLARGINE 16 UNITS: 100 INJECTION, SOLUTION SUBCUTANEOUS at 21:42

## 2020-07-29 RX ADMIN — LIDOCAINE HYDROCHLORIDE 60 MG: 20 INJECTION, SOLUTION EPIDURAL; INFILTRATION; INTRACAUDAL; PERINEURAL at 09:03

## 2020-07-29 RX ADMIN — Medication 10 MG: at 10:07

## 2020-07-29 RX ADMIN — ROCURONIUM BROMIDE 40 MG: 10 INJECTION INTRAVENOUS at 09:03

## 2020-07-29 RX ADMIN — VANCOMYCIN HYDROCHLORIDE 500 MG: 1 INJECTION, POWDER, LYOPHILIZED, FOR SOLUTION INTRAVENOUS at 09:24

## 2020-07-29 RX ADMIN — DEXAMETHASONE SODIUM PHOSPHATE 8 MG: 4 INJECTION, SOLUTION INTRAMUSCULAR; INTRAVENOUS at 09:07

## 2020-07-29 RX ADMIN — CEFAZOLIN SODIUM 2 G: 10 INJECTION, POWDER, FOR SOLUTION INTRAVENOUS at 16:17

## 2020-07-29 RX ADMIN — Medication 10 MG: at 09:19

## 2020-07-29 RX ADMIN — DIPHENHYDRAMINE HCL 25 MG: 25 TABLET ORAL at 16:17

## 2020-07-29 RX ADMIN — ONDANSETRON 4 MG: 2 INJECTION INTRAMUSCULAR; INTRAVENOUS at 10:03

## 2020-07-29 RX ADMIN — FENTANYL CITRATE 25 MCG: 50 INJECTION INTRAMUSCULAR; INTRAVENOUS at 11:01

## 2020-07-29 RX ADMIN — Medication 20 MG: at 09:28

## 2020-07-29 RX ADMIN — CEFAZOLIN SODIUM 2 G: 10 INJECTION, POWDER, FOR SOLUTION INTRAVENOUS at 09:07

## 2020-07-29 RX ADMIN — INSULIN LISPRO 1 UNITS: 100 INJECTION, SOLUTION INTRAVENOUS; SUBCUTANEOUS at 21:43

## 2020-07-29 RX ADMIN — KETOTIFEN FUMARATE 1 DROP: 0.35 SOLUTION/ DROPS OPHTHALMIC at 21:41

## 2020-07-29 RX ADMIN — DIPHENHYDRAMINE HCL 25 MG: 25 TABLET ORAL at 22:47

## 2020-07-29 RX ADMIN — FENTANYL CITRATE 25 MCG: 50 INJECTION, SOLUTION INTRAMUSCULAR; INTRAVENOUS at 12:04

## 2020-07-29 RX ADMIN — FENTANYL CITRATE 25 MCG: 50 INJECTION INTRAMUSCULAR; INTRAVENOUS at 10:49

## 2020-07-29 RX ADMIN — Medication 10 MG: at 10:15

## 2020-07-29 RX ADMIN — PROPOFOL 100 MG: 10 INJECTION, EMULSION INTRAVENOUS at 09:03

## 2020-07-29 RX ADMIN — DOCUSATE SODIUM 50 MG AND SENNOSIDES 8.6 MG 1 TABLET: 8.6; 5 TABLET, FILM COATED ORAL at 21:42

## 2020-07-29 RX ADMIN — FENTANYL CITRATE 25 MCG: 50 INJECTION INTRAMUSCULAR; INTRAVENOUS at 11:10

## 2020-07-29 RX ADMIN — SUGAMMADEX 200 MG: 100 INJECTION, SOLUTION INTRAVENOUS at 10:21

## 2020-07-29 RX ADMIN — Medication 10 MG: at 10:10

## 2020-07-29 ASSESSMENT — PULMONARY FUNCTION TESTS
PIF_VALUE: 17
PIF_VALUE: 1
PIF_VALUE: 16
PIF_VALUE: 16
PIF_VALUE: 5
PIF_VALUE: 17
PIF_VALUE: 16
PIF_VALUE: 17
PIF_VALUE: 15
PIF_VALUE: 16
PIF_VALUE: 15
PIF_VALUE: 17
PIF_VALUE: 17
PIF_VALUE: 15
PIF_VALUE: 15
PIF_VALUE: 17
PIF_VALUE: 17
PIF_VALUE: 0
PIF_VALUE: 16
PIF_VALUE: 17
PIF_VALUE: 3
PIF_VALUE: 16
PIF_VALUE: 14
PIF_VALUE: 17
PIF_VALUE: 14
PIF_VALUE: 16
PIF_VALUE: 16
PIF_VALUE: 17
PIF_VALUE: 17
PIF_VALUE: 16
PIF_VALUE: 18
PIF_VALUE: 13
PIF_VALUE: 14
PIF_VALUE: 17
PIF_VALUE: 16
PIF_VALUE: 17
PIF_VALUE: 17
PIF_VALUE: 2
PIF_VALUE: 16
PIF_VALUE: 30
PIF_VALUE: 13
PIF_VALUE: 18
PIF_VALUE: 10
PIF_VALUE: 15
PIF_VALUE: 16
PIF_VALUE: 15
PIF_VALUE: 17
PIF_VALUE: 16
PIF_VALUE: 17
PIF_VALUE: 17
PIF_VALUE: 15
PIF_VALUE: 15
PIF_VALUE: 13
PIF_VALUE: 15
PIF_VALUE: 15
PIF_VALUE: 17
PIF_VALUE: 0
PIF_VALUE: 14
PIF_VALUE: 15
PIF_VALUE: 21
PIF_VALUE: 16
PIF_VALUE: 14
PIF_VALUE: 0
PIF_VALUE: 16
PIF_VALUE: 2
PIF_VALUE: 16
PIF_VALUE: 17
PIF_VALUE: 0
PIF_VALUE: 13
PIF_VALUE: 16
PIF_VALUE: 18
PIF_VALUE: 14
PIF_VALUE: 16
PIF_VALUE: 0
PIF_VALUE: 17
PIF_VALUE: 23
PIF_VALUE: 13
PIF_VALUE: 16
PIF_VALUE: 17
PIF_VALUE: 17

## 2020-07-29 ASSESSMENT — PAIN DESCRIPTION - LOCATION
LOCATION: SHOULDER

## 2020-07-29 ASSESSMENT — PAIN DESCRIPTION - PROGRESSION
CLINICAL_PROGRESSION: NOT CHANGED
CLINICAL_PROGRESSION: GRADUALLY IMPROVING
CLINICAL_PROGRESSION: NOT CHANGED
CLINICAL_PROGRESSION: GRADUALLY IMPROVING
CLINICAL_PROGRESSION: GRADUALLY IMPROVING
CLINICAL_PROGRESSION: NOT CHANGED
CLINICAL_PROGRESSION: GRADUALLY WORSENING
CLINICAL_PROGRESSION: NOT CHANGED

## 2020-07-29 ASSESSMENT — PAIN - FUNCTIONAL ASSESSMENT
PAIN_FUNCTIONAL_ASSESSMENT: ACTIVITIES ARE NOT PREVENTED
PAIN_FUNCTIONAL_ASSESSMENT: PREVENTS OR INTERFERES SOME ACTIVE ACTIVITIES AND ADLS
PAIN_FUNCTIONAL_ASSESSMENT: PREVENTS OR INTERFERES WITH ALL ACTIVE AND SOME PASSIVE ACTIVITIES
PAIN_FUNCTIONAL_ASSESSMENT: ACTIVITIES ARE NOT PREVENTED
PAIN_FUNCTIONAL_ASSESSMENT: PREVENTS OR INTERFERES SOME ACTIVE ACTIVITIES AND ADLS
PAIN_FUNCTIONAL_ASSESSMENT: ACTIVITIES ARE NOT PREVENTED
PAIN_FUNCTIONAL_ASSESSMENT: PREVENTS OR INTERFERES SOME ACTIVE ACTIVITIES AND ADLS
PAIN_FUNCTIONAL_ASSESSMENT: ACTIVITIES ARE NOT PREVENTED
PAIN_FUNCTIONAL_ASSESSMENT: 0-10
PAIN_FUNCTIONAL_ASSESSMENT: ACTIVITIES ARE NOT PREVENTED
PAIN_FUNCTIONAL_ASSESSMENT: PREVENTS OR INTERFERES SOME ACTIVE ACTIVITIES AND ADLS
PAIN_FUNCTIONAL_ASSESSMENT: ACTIVITIES ARE NOT PREVENTED

## 2020-07-29 ASSESSMENT — PAIN DESCRIPTION - DESCRIPTORS
DESCRIPTORS: THROBBING
DESCRIPTORS: DULL
DESCRIPTORS: THROBBING
DESCRIPTORS: DULL
DESCRIPTORS: THROBBING
DESCRIPTORS: DISCOMFORT
DESCRIPTORS: THROBBING
DESCRIPTORS: DULL

## 2020-07-29 ASSESSMENT — PAIN DESCRIPTION - ONSET
ONSET: ON-GOING
ONSET: SUDDEN
ONSET: ON-GOING

## 2020-07-29 ASSESSMENT — PAIN DESCRIPTION - FREQUENCY
FREQUENCY: CONTINUOUS

## 2020-07-29 ASSESSMENT — PAIN DESCRIPTION - PAIN TYPE
TYPE: ACUTE PAIN;SURGICAL PAIN
TYPE: SURGICAL PAIN

## 2020-07-29 ASSESSMENT — PAIN DESCRIPTION - ORIENTATION
ORIENTATION: LEFT

## 2020-07-29 ASSESSMENT — PAIN SCALES - GENERAL
PAINLEVEL_OUTOF10: 4
PAINLEVEL_OUTOF10: 6
PAINLEVEL_OUTOF10: 5
PAINLEVEL_OUTOF10: 5
PAINLEVEL_OUTOF10: 6
PAINLEVEL_OUTOF10: 0
PAINLEVEL_OUTOF10: 0
PAINLEVEL_OUTOF10: 7
PAINLEVEL_OUTOF10: 5
PAINLEVEL_OUTOF10: 4
PAINLEVEL_OUTOF10: 6
PAINLEVEL_OUTOF10: 8
PAINLEVEL_OUTOF10: 7
PAINLEVEL_OUTOF10: 6
PAINLEVEL_OUTOF10: 6

## 2020-07-29 NOTE — BRIEF OP NOTE
Brief Postoperative Note      Patient: John Cruz  YOB: 1945  MRN: [id number]    Date of Procedure: 7/29/2020    Pre-Op Diagnosis: ARTHRITIS LEFT SHOULDER    Post-Op Diagnosis: Same       Procedure(s):  LEFT REVERSE TOTAL SHOULDER REPLACEMENT    Surgeon(s):  Chrystal Nicholas MD    Assistant:  Physician Assistant: JOHN Olmstead    Anesthesia: General    Estimated Blood Loss (mL): 189     Complications: None    Specimens:   ID Type Source Tests Collected by Time Destination   A : bone left shoulder Tissue Tissue SURGICAL PATHOLOGY Chrystal Nicholas MD 7/29/2020 1011        Implants:  Implant Name Type Inv.  Item Serial No.  Lot No. LRB No. Used Action   IMPL SHOULDER BASE STD PERFM REV 25MM - [id number] Shoulder/Arm/Wrist/Hand IMPL SHOULDER BASE STD PERFM REV 25MM [id number] [employer]  Left 1 Implanted   IMPL SHOULDER GLENOID PERFM REV 36MM - [id number] Shoulder/Arm/Wrist/Hand IMPL SHOULDER GLENOID PERFM REV 36MM [id number] [employer]  Left 1 Implanted   SCREW PERFORM REV CENTRAL 6.5X30MM Screw/Plate/Nail/Inderjit SCREW PERFORM REV CENTRAL 6.5X30MM  [employer]  Left 1 Implanted   SCREW PERFORM REV PERIPHERAL 30MM Screw/Plate/Nail/Inderjit SCREW PERFORM REV PERIPHERAL 30MM  [employer]  Left 2 Implanted   IMPL HUM STEM LNG PTC - [id number] Shoulder/Arm/Wrist/Hand IMPL HUM STEM LNG PTC [id number] [employer]  Left 1 Implanted   IMPL INSERT REVERSED [id number] Shoulder/Arm/Wrist/Hand IMPL INSERT REVERSED [id number]  [employer] [id number] Left 1 Implanted   IMPL TRAY REVERSED ASCEND FLX ECCENT 0MM - [id number] Shoulder/Arm/Wrist/Hand IMPL TRAY REVERSED ASCEND FLX ECCENT 0MM [id number]  Left 1 Implanted         Drains: * No LDAs found *    Findings: OA with rotator cuff arthropathy    The patient was counseled at length about the risks of axel Covid-19 during their perioperative period and any recovery window from their procedure. The patient was made aware that axel Covid-19  may worsen their prognosis for recovering from their procedure  and lend to a higher morbidity and/or mortality risk. All material risks, benefits, and reasonable alternatives including postponing the procedure were discussed. The patient does wish to proceed with the procedure at this time.           Electronically signed by Cheryl Hernández MD on 7/29/2020 at 10:12 AM

## 2020-07-29 NOTE — CARE COORDINATION
7/29 met with patient at bedside to discuss dc needs. Patient plans to return home- states her son lives with her and will assist with her care. Her daughter-in-law will transport home at discharge. Denies dc needs.   Electronically signed by Carlos James on 7/29/2020 at 4:16 PM

## 2020-07-29 NOTE — PROGRESS NOTES
Educated patient on purpose of 4 eyes skin assessment and asked patient if allowed to perform, patient verbalized understanding and agreed to assessment. 4 Eyes Skin Assessment     The patient is being assess for  Post-Op Surgical    I agree that 2 RN's have performed a thorough Head to Toe Skin Assessment on the patient. ALL assessment sites listed below have been assessed. Areas assessed by both nurses: ezequiel and danna  [x]   Head, Face, and Ears   [x]   Shoulders, Back, and Chest  [x]   Arms, Elbows, and Hands   [x]   Coccyx, Sacrum, and IschIum  [x]   Legs, Feet, and Heels        Does the Patient have Skin Breakdown?   No         Adelso Prevention initiated:  NA   Wound Care Orders initiated:  NA      WOC nurse consulted for Pressure Injury (Stage 3,4, Unstageable, DTI, NWPT, and Complex wounds), New and Established Ostomies:  NA      Nurse 1 eSignature: Electronically signed by Orion Gaucher, RN on 7/29/20 at 3:39 PM EDT    **SHARE this note so that the co-signing nurse is able to place an eSignature**    Nurse 2 eSignature:

## 2020-07-29 NOTE — PROGRESS NOTES
Arrives to pacu, drowsy, respirations easy, monitors placed, nods no to pain, moves lefts fingers to command

## 2020-07-29 NOTE — H&P
Preoperative H&P Update     The patient's History and Physical in the medical record dated 7/29/20 was reviewed by me today. I reviewed the HPI, medications, allergies, reason for surgery, diagnosis and treatment plan and there has been no change. The patient was evaluated by me today. Prior to Visit Medications    Medication Sig Taking? Authorizing Provider   ranolazine (RANEXA) 500 MG extended release tablet Take 500 mg by mouth 2 times daily Yes Historical Provider, MD   ketotifen (THERATEARS ALLERGY) 0.025 % ophthalmic solution Place 1 drop into both eyes as needed THERATEARS Yes Historical Provider, MD   Multiple Vitamins-Minerals (PRORENAL QD PO) Take 1 tablet by mouth daily Yes Historical Provider, MD   chlorhexidine (HIBICLENS) 4 % external liquid Apply topically daily as needed for 5 days. Yes Juan Pablo Krishnan MD   aspirin 81 MG tablet Take 81 mg by mouth daily Yes Historical Provider, MD   omeprazole (PRILOSEC) 20 MG delayed release capsule Take 20 mg by mouth 2 times daily  Yes Historical Provider, MD   famotidine (PEPCID) 40 MG tablet Take 40 mg by mouth daily Yes Historical Provider, MD   rosuvastatin (CRESTOR) 10 MG tablet Take 10 mg by mouth nightly  Yes Historical Provider, MD   ticagrelor (BRILINTA) 90 MG TABS tablet Take 60 mg by mouth 2 times daily  Yes Historical Provider, MD   traMADol (ULTRAM) 50 MG tablet Take 50 mg by mouth every 6 hours as needed.   Yes Historical Provider, MD   KLOR-CON M20 20 MEQ tablet Take 20 mEq by mouth daily  Yes Historical Provider, MD   metoprolol (LOPRESSOR) 100 MG tablet Take 100 mg by mouth 2 times daily  Yes Historical Provider, MD   cephALEXin (KEFLEX) 500 MG capsule Take 500 mg by mouth daily Frequent UTI Yes Historical Provider, MD   hydrALAZINE (APRESOLINE) 25 MG tablet Take 25 mg by mouth 2 times daily  Yes Historical Provider, MD   levothyroxine (SYNTHROID) 25 MCG tablet Take 25 mcg by mouth daily  Yes Historical Provider, MD   losartan (COZAAR) 25 MG tablet Take 25 mg by mouth nightly  Yes Historical Provider, MD   ursodiol (ACTIGALL) 300 MG capsule Take 300 mg by mouth 3 times daily (before meals)  Yes Historical Provider, MD   zolpidem (AMBIEN) 10 MG tablet Take by mouth nightly  Yes Historical Provider, MD   penicillin v potassium (VEETID) 500 MG tablet Take 500 mg by mouth 4 times daily Yes Historical Provider, MD   ferrous sulfate 325 (65 FE) MG tablet Take 325 mg by mouth 2 times daily  Yes Historical Provider, MD   Cholecalciferol (VITAMIN D) 2000 UNITS CAPS capsule Take by mouth daily  Yes Historical Provider, MD   Biotin (BIOTIN MAXIMUM STRENGTH) 10 MG tablet Take 10 mg by mouth daily Yes Historical Provider, MD   Multiple Vitamins-Minerals (PRORENAL QD PO) Take by mouth Yes Historical Provider, MD   calcium carbonate (OSCAL) 500 MG TABS tablet Take 500 mg by mouth daily  Yes Historical Provider, MD   B Complex-C (SUPER B COMPLEX/VITAMIN C PO) Take 1 tablet by mouth  Yes Historical Provider, MD   Ascorbic Acid (VITAMIN C) 500 MG CAPS Take 1 tablet by mouth daily  Yes Historical Provider, MD   EPINEPHrine (EPIPEN) 0.3 MG/0.3ML SOAJ injection Inject 0.3 mg into the muscle  Historical Provider, MD        Physical exam findings for this update include:  Vitals:    07/29/20 0713   BP: 118/68   Pulse: 58   Resp: 18   Temp: 98.3 °F (36.8 °C)   SpO2: 99%     Airway is intact Chest: breathing comfortably  Heart: regular rate and rhythm. Examination of the body region where surgery is to be performed shows skin is intact at the operative site. The risk, benefits, and alternatives of the proposed procedure have been explained to the patient (or appropriate guardian) and understanding verbalized. All questions answered. Patient wishes to proceed. The patient was counseled at length about the risks of axel Covid-19 during their perioperative period and any recovery window from their procedure.   The patient was made aware that axel Covid-19 may worsen their prognosis for recovering from their procedure  and lend to a higher morbidity and/or mortality risk. All material risks, benefits, and reasonable alternatives including postponing the procedure were discussed. The patient does wish to proceed with the procedure at this time.         Aden Main MD    Electronically signed by Teddy Lucas MD on 7/29/2020 at 7:29 AM

## 2020-07-29 NOTE — ANESTHESIA POSTPROCEDURE EVALUATION
[facility] Department of Anesthesiology  Post-Anesthesia Note       Name:  Pérez Blount                                  Age:  77 y.o. MRN:  [id number]     Last Vitals & Oxygen Saturation: /61   Pulse 64   Temp 97.2 °F (36.2 °C) (Temporal)   Resp 17   Ht 5' (1.524 m)   Wt 143 lb 1.3 oz (64.9 kg)   SpO2 100%   BMI 27.94 kg/m²   Patient Vitals for the past 4 hrs:   BP Temp Temp src Pulse Resp SpO2   07/29/20 1215 124/61 -- -- 64 17 100 %   07/29/20 1145 (!) 112/58 -- -- 65 15 100 %   07/29/20 1130 -- -- -- 67 21 100 %   07/29/20 1126 -- -- -- 64 11 100 %   07/29/20 1125 -- -- -- 67 14 100 %   07/29/20 1120 -- -- -- 67 12 99 %   07/29/20 1115 113/68 -- -- 66 18 98 %   07/29/20 1110 -- -- -- 68 15 99 %   07/29/20 1105 -- -- -- 68 12 99 %   07/29/20 1100 116/63 -- -- 65 11 100 %   07/29/20 1055 124/62 -- -- 66 17 100 %   07/29/20 1050 116/62 -- -- 66 14 100 %   07/29/20 1045 128/68 -- -- 67 14 100 %   07/29/20 1040 127/69 -- -- 67 11 100 %   07/29/20 1035 -- -- -- 68 10 100 %   07/29/20 1034 -- -- -- 69 11 100 %   07/29/20 1033 129/76 -- -- 69 13 100 %   07/29/20 1032 -- -- -- 70 10 100 %   07/29/20 1031 -- -- -- 70 16 100 %   07/29/20 1030 -- -- -- 68 -- --   07/29/20 1028 -- 97.2 °F (36.2 °C) Temporal 68 -- --       Level of consciousness:  Awake, alert    Respiratory: Respirations easy, no distress. Stable. Cardiovascular: Hemodynamically stable. Hydration: Adequate. PONV: Adequately managed. Post-op pain: Adequately controlled. Post-op assessment: Tolerated anesthetic well without complication. Complications:  None.     Michael Yap MD  July 29, 2020   12:34 PM

## 2020-07-29 NOTE — PROGRESS NOTES
[facility] Orthopedic Surgery   Progress Note      S/P :  SUBJECTIVE  In bed. Alert and oriented. . Pain is   described in left shoulder and with the intensity of moderate. Pain is described as aching. OBJECTIVE              Physical                      VITALS:  BP (!) 140/74   Pulse 66   Temp 96.7 °F (35.9 °C) (Oral)   Resp 16   Ht 5' (1.524 m)   Wt 143 lb 1.3 oz (64.9 kg)   SpO2 99%   BMI 27.94 kg/m²                     MUSCULOSKELETAL:  left hand NVI. Wiggles toes to command. Pedal pulses are palpable. NEUROLOGIC:                                  Sensory:  Touch:  Left Upper Extremity:  normal                                                 Surgical wound appears clean and dry left shoulder ., Arm in sling    Data       CBC:   Lab Results   Component Value Date    WBC 6.4 07/23/2020    RBC 3.51 07/23/2020    HGB 12.0 07/23/2020    HCT 35.1 07/23/2020    .0 07/23/2020    MCH 34.2 07/23/2020    MCHC 34.1 07/23/2020    RDW 13.5 07/23/2020     07/23/2020    MPV 9.5 07/23/2020        WBC:    Lab Results   Component Value Date    WBC 6.4 07/23/2020        Hemoglobin/Hematocrit:    Lab Results   Component Value Date    HGB 12.0 07/23/2020    HCT 35.1 07/23/2020        PT/INR:    Lab Results   Component Value Date    PROTIME 10.7 07/23/2020    INR 0.92 07/23/2020              Current Inpatient Medications             No current facility-administered medications for this encounter.      ASSESSMENT AND PLAN      Post left reverse TSA, stable exam  Home today  NWB left arm, Codman exercises  PT OT to see prior to [facility]  7/29/2020  2:14 PM

## 2020-07-29 NOTE — ANESTHESIA PRE PROCEDURE
[facility] Department of Anesthesiology  Pre-Anesthesia Evaluation/Consultation       Name:  Brennon Cai  : [id number]  Age:  77 y.o. MRN:  [id number]  Date: 2020           Surgeon: Surgeon(s):  Janette Kern MD    Procedure: Procedure(s):  LEFT REVERSE TOTAL SHOULDER REPLACEMENT     Allergies   Allergen Reactions    Atorvastatin Nausea Only and Nausea And Vomiting    Lipitor [Atorvastatin Calcium]      Abnormal liver enzymes    Other Anaphylaxis     YELLOW JACKETS    Sulfamethoxazole-Trimethoprim Itching    Clopidogrel      Other reaction(s):  Other: See Comments-resistance to plavix=PATIENT STATES IT DOESN'T WORK      Gabapentin Other (See Comments)     \"Causes me to sleep all the time\"    Lisinopril Other (See Comments)     cough      Morphine Hives    Oxycodone Hives     40 mg or higher will break out in hives        Oxycodone-Acetaminophen Nausea Only    Percocet [Oxycodone-Acetaminophen] Nausea Only    Sulfa Antibiotics Itching     Patient Active Problem List   Diagnosis    Arthritis of right shoulder region    Biceps tendonitis on right    S/p reverse total shoulder arthroplasty     Past Medical History:   Diagnosis Date    Arthritis     CAD (coronary artery disease)     Cerebral artery occlusion with cerebral infarction ([address] -NOV,DEC    CVA and TIA r/t blood clots-APHASIA    Hiatal hernia     History of blood transfusion     twice during CABG    Hyperlipidemia     Hypertension     Kidney disease, chronic, stage III (GFR 30-59 ml/min) ([address]     Paraphasia     PBC (primary biliary cirrhosis)     Thyroid disease     Wears glasses      Past Surgical History:   Procedure Laterality Date    ABDOMEN SURGERY      lap band    BLADDER SURGERY  2007    BRAIN SURGERY  2004    @ [facility]--to improve blood flow to brain    BUNIONECTOMY  10/2001   [address] [place] [address] CARDIAC SURGERY  3/2010, 10/2010    5 vessel    COLONOSCOPY      Av  Min: 25   Min taken time: 20  Max: 25   Max taken time: 20  BP  Min: 118/68   Min taken time: 20  Max: 118/68   Max taken time: 20 0713  SpO2  Av %  Min: 99 %   Min taken time: 20  Max: 99 %   Max taken time: 20    BP Readings from Last 3 Encounters:   20 118/68   12/15/16 128/80   09/15/16 104/60     BMI  Body mass index is 27.94 kg/m². Estimated body mass index is 27.94 kg/m² as calculated from the following:    Height as of this encounter: 5' (1.524 m). Weight as of this encounter: 143 lb 1.3 oz (64.9 kg). CBC   Lab Results   Component Value Date    WBC 6.4 2020    RBC 3.51 2020    HGB 12.0 2020    HCT 35.1 2020    .0 2020    RDW 13.5 2020     2020     CMP    Lab Results   Component Value Date     2020    K 5.1 2020     2020    CO2 24 2020    BUN 26 2020    CREATININE 1.2 2020    GFRAA 53 2020    LABGLOM 44 2020    GLUCOSE 92 2020    CALCIUM 9.5 2020     BMP    Lab Results   Component Value Date     2020    K 5.1 2020     2020    CO2 24 2020    BUN 26 2020    CREATININE 1.2 2020    CALCIUM 9.5 2020    GFRAA 53 2020    LABGLOM 44 2020    GLUCOSE 92 2020     POCGlucose  No results for input(s): GLUCOSE in the last 72 hours.    Coags    Lab Results   Component Value Date    PROTIME 10.7 2020    INR 0.92 2020    APTT 26.8      HCG (If Applicable) No results found for: PREGTESTUR, PREGSERUM, HCG, HCGQUANT   ABGs No results found for: PHART, PO2ART, LQC1DMV, VZV1BID, BEART, K5HYKRNC   Type & Screen (If Applicable)  No results found for: LABABO, LABRH                         BMI: Wt Readings from Last 3 Encounters:       NPO Status:   Date of last liquid consumption: 20   Time of last liquid consumption: 2350 Date of last solid food consumption: 07/28/20      Time of last solid consumption: 2100       Anesthesia Evaluation  Patient summary reviewed no history of anesthetic complications:   Airway: Mallampati: III  TM distance: >3 FB   Neck ROM: full   Dental:    (+) partials      Pulmonary:Negative Pulmonary ROS and normal exam                               Cardiovascular:  Exercise tolerance: good (>4 METS),   (+) hypertension:, CAD:, CABG/stent (cabg x2; 9 stents, last Nov 2018; brilinta 5 days ago):,       ECG reviewed  Rhythm: regular  Rate: normal           Beta Blocker:  Dose within 24 Hrs         Neuro/Psych:   (+) CVA (10 years ago; some aphasia):, TIA,             GI/Hepatic/Renal:   (+) hiatal hernia, GERD:, liver disease (primary biliary cirrhosis):, renal disease:,           Endo/Other:    (+) hypothyroidism, blood dyscrasia: anticoagulation therapy:., .                 Abdominal:           Vascular: negative vascular ROS. Anesthesia Plan      general     ASA 3       Induction: intravenous. MIPS: Postoperative opioids intended and Prophylactic antiemetics administered. Anesthetic plan and risks discussed with patient. Plan discussed with CRNA. This pre-anesthesia assessment may be used as a history and physical.    DOS STAFF ADDENDUM:    Pt seen and examined, chart reviewed (including anesthesia, drug and allergy history). No interval changes to history and physical examination. Anesthetic plan, risks, benefits, alternatives, and personnel involved discussed with patient. Questions and concerns addressed. Patient(family) verbalized an understanding and agrees to proceed.       Jacob Camacho MD  July 29, 2020  7:31 AM

## 2020-07-29 NOTE — PROGRESS NOTES
Occupational Therapy   Occupational Therapy Initial Assessment  Date: 2020   Patient Name: Gela Bravo  MRN: [id number]     : 1945    Date of Service: 2020    Assessment: Pt is 77 y.o. F who presents with arthritis of L shoulder. Pt is s/p L reverse total shoulder replacement and is NWBing in sling. PTA pt lives with son in one story home with 3 GLEN. Pt reports independence in self-care tasks, homemaking responsibilities, and functional mobility with no device. Currently, pt presents with ROM/strength in RUE WFL, LUE in sling for self-care and transfers. Pt completed bed mobility with SBA and sit <> stand transfers with CGA. Pt completed functional mobility with no device with CGA, no overt LOB however was reaching for steadying surfaces. Pt completed toileting with CGA and required min A for LB ADLs. Anticipate pt to require up to min A for ADL needs. Anticipate pt safe to d/c home with initial  supervision/assist from family, no further therapy needs at this time. Discharge Recommendations:  24 hour supervision or assist  OT Equipment Recommendations  Equipment Needed: Yes  Mobility Devices: ADL Assistive Devices  ADL Assistive Devices: Shower Chair with back  Other: Pt may benefit from shower chair to ensure safety during bathing. Assessment   Performance deficits / Impairments: Decreased functional mobility ; Decreased balance;Decreased ADL status; Decreased endurance;Decreased strength  Assessment: Pt is 77 y.o. F who presents with arthritis of L shoulder. Pt is s/p L reverse total shoulder replacement and is NWBing in sling. PTA pt lives with son in one story home with 3 GLEN. Pt reports independence in self-care tasks, homemaking responsibilities, and functional mobility with no device. Currently, pt presents with ROM/strength in RUE WFL, LUE in sling for self-care and transfers. Pt completed bed mobility with SBA and sit <> stand transfers with CGA.  Pt completed functional mobility with no device with CGA, no overt LOB however was reaching for steadying surfaces. Pt completed toileting with CGA and required min A for LB ADLs. Anticipate pt to require up to min A for ADL needs. Anticipate pt safe to d/c home with initial 24/7 supervision/assist from family, no further therapy needs at this time. Prognosis: Fair;Good  Decision Making: Medium Complexity  History: PMH: CAD, CVA, primary biliary cirrhosis, CKD, HTN  Exam: ADLs, transfers, func mob, bed mob  Assistance / Modification: CGA for mobility, min A for ADLs  OT Education: OT Role;Plan of Care;ADL Adaptive Strategies;Transfer Training;Precautions  REQUIRES OT FOLLOW UP: Yes  Activity Tolerance  Activity Tolerance: Patient Tolerated treatment well  Activity Tolerance: Emotionally labile throughout session - provided encouragement. Safety Devices  Safety Devices in place: Yes(RN (Robe) notified)  Type of devices: Left in chair;Chair alarm in place;Nurse notified;Call light within reach;Gait belt           Patient Diagnosis(es): The encounter diagnosis was Arthritis of left shoulder region. has a past medical history of Arthritis, CAD (coronary artery disease), Cerebral artery occlusion with cerebral infarction ([address] Hiatal hernia, History of blood transfusion, Hyperlipidemia, Hypertension, Kidney disease, chronic, stage III (GFR 30-59 ml/min) ([address] Paraphasia, PBC (primary biliary cirrhosis), Thyroid disease, and Wears glasses. has a past surgical history that includes Tonsillectomy (1965); Hemorrhoid surgery (1989); Hysterectomy, total abdominal (9/2001); Bunionectomy (10/2001); Bladder surgery (1/2007); Cystoscopy (6/2008); hernia repair (4/2007); brain surgery (07/2004); eye surgery (Right, 11/04/2014); Cardiac surgery (3/2010, 10/2010); Coronary angioplasty (4/2002, 3/2003, 1/2011, 8/11/15); skin biopsy (04/2005); Abdomen surgery; shoulder surgery (09/14/2016);  Colonoscopy; joint replacement (10/30/2013); and joint replacement (Left). Restrictions  Restrictions/Precautions  Restrictions/Precautions: Fall Risk, Weight Bearing  Upper Extremity Weight Bearing Restrictions  Left Upper Extremity Weight Bearing: Non Weight Bearing  Position Activity Restriction  Other position/activity restrictions: LUE NWBing in sling    Subjective   General  Chart Reviewed: Yes  Patient assessed for rehabilitation services?: Yes  Additional Pertinent Hx: Pt is 77 y.o. F who presents with arthritis of L shoulder. Pt is s/p L reverse total shoulder replacement and is NWBing in sling. PMH: CAD, CVA, primary biliary cirrhosis, CKD, HTN  Family / Caregiver Present: No  Referring Practitioner: Manoj March MD  Diagnosis: Arthritis of L shoulder  Subjective  Subjective: Pt met bedside, agreeable for therapy evaluation and OOB activity. Pt tearful and wiping eyes. Pt reports \"I dont know why I am so emotional today\".   Patient Currently in Pain: (Reporting moderate pain in shoulder)  Pain Assessment  Pain Level: 7  Vital Signs  Patient Currently in Pain: (Reporting moderate pain in shoulder)  Oxygen Therapy  SpO2: 99 %  Pulse Oximeter Device Mode: Intermittent  Pulse Oximeter Device Location: Finger  O2 Device: Nasal cannula  O2 Flow Rate (L/min): 2 L/min     Social/Functional History  Social/Functional History  Lives With: Son  Type of Home: House  Home Layout: One level  Home Access: Stairs to enter with rails  Entrance Stairs - Number of Steps: 3 GLEN  Entrance Stairs - Rails: Both  Bathroom Shower/Tub: Tub/Shower unit, Walk-in shower, Shower chair with back(Typically uses walk-in shower; thinks she has shower chair)  Bathroom Toilet: Handicap height  Bathroom Equipment: Hand-held shower, Toilet raiser, Grab bars around toilet  Bathroom Accessibility: Accessible  Home Equipment: Reacher, Lift chair, Standard walker  ADL Assistance: [address]: Independent  Ambulation Assistance: Independent  Transfer Assistance: per week: 1 or 2 more sessions  Current Treatment Recommendations: Strengthening, Endurance Training, Balance Training, Functional Mobility Training, Safety Education & Training, Equipment Evaluation, Education, & procurement, Patient/Caregiver Education & Training, Self-Care / ADL    AM-PAC Score   Anticipate no further OT needs at d/c. AM-PAC Inpatient Daily Activity Raw Score: 18 (07/29/20 1458)  AM-PAC Inpatient ADL T-Scale Score : 38.66 (07/29/20 1458)  ADL Inpatient CMS 0-100% Score: 46.65 (07/29/20 1458)  ADL Inpatient CMS G-Code Modifier : CK (07/29/20 1458)    Goals  Short term goals  Time Frame for Short term goals: prior to d/c  Short term goal 1: Pt will complete functional mobility and transfers with SBA  Short term goal 2: Pt will complete toileting with SBA  Short term goal 3: Pt will complete dressing with SBA  Short term goal 4: Pt will complete bathing with SBA  Short term goal 5: Pt will tolerate 4+ minutes of standing activity to increase strength and activity tolerance for self-care and transfers  Patient Goals   Patient goals : \"to go home tomorrow\"       Therapy Time   Individual Concurrent Group Co-treatment   Time In 1405         Time Out 1500         Minutes 55         Timed Code Treatment Minutes: 40 Minutes(15 min eval)     If pt is discharged prior to next OT session, this note will serve as the discharge summary.     GERARDO Villa/T#[id number]

## 2020-07-29 NOTE — PROGRESS NOTES
Checking on patient Q2H for nutrition needs, hygiene needs, comfort measures, mobility, fall risk interventions, and safe environment. All precautions and interventions in place. Educated patient on use of call light and telephone. Patient verbalizes understanding. Call light/telephone in reach.   Electronically signed by Marshall Piedra RN on 7/29/2020 at 1:39 PM

## 2020-07-30 VITALS
SYSTOLIC BLOOD PRESSURE: 116 MMHG | OXYGEN SATURATION: 95 % | RESPIRATION RATE: 16 BRPM | DIASTOLIC BLOOD PRESSURE: 79 MMHG | HEART RATE: 69 BPM | BODY MASS INDEX: 28.22 KG/M2 | TEMPERATURE: 97.5 F | HEIGHT: 60 IN | WEIGHT: 143.74 LBS

## 2020-07-30 LAB
ESTIMATED AVERAGE GLUCOSE: 111.2 MG/DL
GLUCOSE BLD-MCNC: 121 MG/DL (ref 70–99)
HBA1C MFR BLD: 5.5 %
HCT VFR BLD CALC: 30 % (ref 36–48)
HEMOGLOBIN: 10.2 G/DL (ref 12–16)
MRSA SCREEN RT-PCR: NORMAL
PERFORMED ON: ABNORMAL

## 2020-07-30 PROCEDURE — 6370000000 HC RX 637 (ALT 250 FOR IP): Performed by: NURSE PRACTITIONER

## 2020-07-30 PROCEDURE — 36415 COLL VENOUS BLD VENIPUNCTURE: CPT

## 2020-07-30 PROCEDURE — 97530 THERAPEUTIC ACTIVITIES: CPT

## 2020-07-30 PROCEDURE — 85018 HEMOGLOBIN: CPT

## 2020-07-30 PROCEDURE — 6360000002 HC RX W HCPCS: Performed by: ORTHOPAEDIC SURGERY

## 2020-07-30 PROCEDURE — 83036 HEMOGLOBIN GLYCOSYLATED A1C: CPT

## 2020-07-30 PROCEDURE — 97535 SELF CARE MNGMENT TRAINING: CPT

## 2020-07-30 PROCEDURE — 85014 HEMATOCRIT: CPT

## 2020-07-30 PROCEDURE — 2580000003 HC RX 258: Performed by: ORTHOPAEDIC SURGERY

## 2020-07-30 PROCEDURE — 94760 N-INVAS EAR/PLS OXIMETRY 1: CPT

## 2020-07-30 PROCEDURE — 97110 THERAPEUTIC EXERCISES: CPT

## 2020-07-30 PROCEDURE — 6370000000 HC RX 637 (ALT 250 FOR IP): Performed by: ORTHOPAEDIC SURGERY

## 2020-07-30 RX ORDER — VALSARTAN 80 MG/1
80 TABLET ORAL NIGHTLY
COMMUNITY
Start: 2020-07-25

## 2020-07-30 RX ADMIN — SODIUM CHLORIDE, PRESERVATIVE FREE 10 ML: 5 INJECTION INTRAVENOUS at 09:35

## 2020-07-30 RX ADMIN — OMEPRAZOLE 20 MG: 20 CAPSULE, DELAYED RELEASE ORAL at 06:04

## 2020-07-30 RX ADMIN — KETOTIFEN FUMARATE 1 DROP: 0.35 SOLUTION/ DROPS OPHTHALMIC at 09:47

## 2020-07-30 RX ADMIN — URSODIOL 300 MG: 300 CAPSULE ORAL at 06:04

## 2020-07-30 RX ADMIN — HYDROCODONE BITARTRATE AND ACETAMINOPHEN 2 TABLET: 5; 325 TABLET ORAL at 09:34

## 2020-07-30 RX ADMIN — HYDROCODONE BITARTRATE AND ACETAMINOPHEN 1 TABLET: 5; 325 TABLET ORAL at 04:20

## 2020-07-30 RX ADMIN — LEVOTHYROXINE SODIUM 25 MCG: 0.03 TABLET ORAL at 06:04

## 2020-07-30 RX ADMIN — CEFAZOLIN SODIUM 2 G: 10 INJECTION, POWDER, FOR SOLUTION INTRAVENOUS at 00:49

## 2020-07-30 ASSESSMENT — PAIN - FUNCTIONAL ASSESSMENT
PAIN_FUNCTIONAL_ASSESSMENT: PREVENTS OR INTERFERES SOME ACTIVE ACTIVITIES AND ADLS

## 2020-07-30 ASSESSMENT — PAIN DESCRIPTION - PAIN TYPE
TYPE: SURGICAL PAIN
TYPE: ACUTE PAIN;SURGICAL PAIN
TYPE: ACUTE PAIN;SURGICAL PAIN
TYPE: SURGICAL PAIN

## 2020-07-30 ASSESSMENT — PAIN DESCRIPTION - DESCRIPTORS
DESCRIPTORS: ACHING;DISCOMFORT
DESCRIPTORS: ACHING;DISCOMFORT
DESCRIPTORS: ACHING
DESCRIPTORS: ACHING

## 2020-07-30 ASSESSMENT — PAIN DESCRIPTION - PROGRESSION
CLINICAL_PROGRESSION: NOT CHANGED
CLINICAL_PROGRESSION: GRADUALLY WORSENING
CLINICAL_PROGRESSION: NOT CHANGED
CLINICAL_PROGRESSION: GRADUALLY IMPROVING

## 2020-07-30 ASSESSMENT — PAIN DESCRIPTION - ORIENTATION
ORIENTATION: LEFT

## 2020-07-30 ASSESSMENT — PAIN DESCRIPTION - ONSET
ONSET: GRADUAL
ONSET: ON-GOING
ONSET: ON-GOING
ONSET: GRADUAL

## 2020-07-30 ASSESSMENT — PAIN SCALES - GENERAL
PAINLEVEL_OUTOF10: 0
PAINLEVEL_OUTOF10: 5
PAINLEVEL_OUTOF10: 6
PAINLEVEL_OUTOF10: 4

## 2020-07-30 ASSESSMENT — PAIN DESCRIPTION - LOCATION
LOCATION: SHOULDER

## 2020-07-30 ASSESSMENT — PAIN DESCRIPTION - FREQUENCY
FREQUENCY: INTERMITTENT
FREQUENCY: CONTINUOUS
FREQUENCY: INTERMITTENT
FREQUENCY: CONTINUOUS

## 2020-07-30 NOTE — PROGRESS NOTES
Pharmacy Medication Reconciliation Note     List of medications patient is currently taking is complete. Source of information:   1. Patient  2. EMR      Notes regarding home medications:   1. Patient gets her Brilinta from the [employer] patient assistance program.  All of her other medications are filled by [facility].       Rosalina Lawson PharmD, BCPS  7/30/2020  8:20 AM

## 2020-07-30 NOTE — PLAN OF CARE
Problem: Cardiac:  Goal: Ability to maintain vital signs within normal range will improve  Description: Ability to maintain vital signs within normal range will improve  7/30/2020 0731 by Sofia Edgar RN  Outcome: Ongoing  Note: Ability to maintain vital signs within normal range will improve. Electronically signed by Sofia Edgar RN on 7/30/2020 at 7:31 AM    7/30/2020 0141 by Denita Will RN  Outcome: Ongoing  Note: Patients vital signs will remain in normal range/improve. Problem: Discharge Planning:  Goal: Discharged to appropriate level of care  7/30/2020 0731 by Sofia Edgar RN  Outcome: Ongoing  Note: Discharged to appropriate level of care. Electronically signed by Sofia Edgar RN on 7/30/2020 at 7:31 AM    7/30/2020 0141 by Denita Will RN  Outcome: Ongoing  Note: Patient will be discharged to appropriate level of care. Problem: Mobility - Impaired:  Goal: Mobility will improve  7/30/2020 0731 by Sofia Edgar RN  Outcome: Ongoing  Note: Mobility will improve. Electronically signed by Sofia Edgar RN on 7/30/2020 at 7:31 AM    7/30/2020 0141 by Denita Will RN  Outcome: Ongoing  Note: Patients mobility will improve. Problem: Infection - Surgical Site:  Goal: Will show no infection signs and symptoms  7/30/2020 0731 by Sofia Edgar RN  Outcome: Ongoing  Note: Will show no signs/symptoms infection. Electronically signed by Sofia Edgar RN on 7/30/2020 at 7:32 AM    7/30/2020 0141 by Denita Will RN  Outcome: Ongoing  Note: Patient will show no infection signs and symptoms. Problem: Pain - Acute:  Goal: Pain level will decrease  Description: Pain level will decrease  7/30/2020 0731 by Sofia Edgar RN  Outcome: Ongoing  Note: Pain level will decrease. Electronically signed by Sofia Edgar RN on 7/30/2020 at 7:32 AM    7/30/2020 0141 by Denita Will RN  Outcome: Ongoing  Note: Patients pain level will decrease.       Problem: Skin 7:33 AM    7/30/2020 0141 by Scott Ruiz RN  Outcome: Ongoing

## 2020-07-30 NOTE — PLAN OF CARE
Problem: Cardiac:  Goal: Ability to maintain vital signs within normal range will improve  Description: Ability to maintain vital signs within normal range will improve  7/30/2020 0141 by Josemanuel Lawton RN  Outcome: Ongoing  Note: Patients vital signs will remain in normal range/improve. 7/29/2020 1337 by Monalisa Lema RN  Outcome: Ongoing  Note: Vital signs will be within normal limits this shift. Problem: Discharge Planning:  Goal: Discharged to appropriate level of care  7/30/2020 0141 by Josemanuel Lawton RN  Outcome: Ongoing  Note: Patient will be discharged to appropriate level of care. 7/29/2020 1337 by Monalisa Lema RN  Outcome: Ongoing  Note: Working with patient, physician, and social work to discharge patient to the appropriate level of care. Problem: Mobility - Impaired:  Goal: Mobility will improve  7/30/2020 0141 by Josemanuel Lawton RN  Outcome: Ongoing  Note: Patients mobility will improve. 7/29/2020 1337 by Monalisa Lema RN  Outcome: Ongoing  Note: Patient will describe an ease of ambulation this shift. Problem: Infection - Surgical Site:  Goal: Will show no infection signs and symptoms  7/30/2020 0141 by Josemanuel Lawton RN  Outcome: Ongoing  Note: Patient will show no infection signs and symptoms. 7/29/2020 1337 by Monalisa Lema RN  Outcome: Ongoing  Note: Patient is alert and oriented, afebrile, has no complaints of pain, skin is intact and appropriate for ethnicity in color       Problem: Pain - Acute:  Goal: Pain level will decrease  Description: Pain level will decrease  7/30/2020 0141 by Josemanuel Lawton RN  Outcome: Ongoing  Note: Patients pain level will decrease. 7/29/2020 1337 by Monalisa Lema RN  Outcome: Ongoing  Note: Pain /discomfort being managed with PRN analgesics per MD orders. Patient able to express presence and absence of pain and rate pain appropriately using numerical scale.         Problem: Skin Integrity:  Goal:

## 2020-07-30 NOTE — PLAN OF CARE
Problem: Cardiac:  Goal: Ability to maintain vital signs within normal range will improve  Description: Ability to maintain vital signs within normal range will improve  7/30/2020 1300 by Gloria Morillo RN  Outcome: Completed  7/30/2020 0731 by Gloria Morillo RN  Outcome: Ongoing  Note: Ability to maintain vital signs within normal range will improve. Electronically signed by Gloria Morillo RN on 7/30/2020 at 7:31 AM    7/30/2020 0141 by Stiven Gilmore RN  Outcome: Ongoing  Note: Patients vital signs will remain in normal range/improve. Problem: Discharge Planning:  Goal: Discharged to appropriate level of care  7/30/2020 1300 by Gloria Morillo RN  Outcome: Completed  7/30/2020 0731 by Gloria Morillo RN  Outcome: Ongoing  Note: Discharged to appropriate level of care. Electronically signed by Gloria Morillo RN on 7/30/2020 at 7:31 AM    7/30/2020 0141 by Stiven Gilmore RN  Outcome: Ongoing  Note: Patient will be discharged to appropriate level of care. Problem: Mobility - Impaired:  Goal: Mobility will improve  7/30/2020 1300 by Gloria Morillo RN  Outcome: Completed  7/30/2020 0731 by Gloria Morillo RN  Outcome: Ongoing  Note: Mobility will improve. Electronically signed by Gloria Morillo RN on 7/30/2020 at 7:31 AM    7/30/2020 0141 by Stiven Gilmore RN  Outcome: Ongoing  Note: Patients mobility will improve. Problem: Infection - Surgical Site:  Goal: Will show no infection signs and symptoms  7/30/2020 1300 by Gloria Morillo RN  Outcome: Completed  7/30/2020 0731 by Gloria Morillo RN  Outcome: Ongoing  Note: Will show no signs/symptoms infection. Electronically signed by Gloria Morillo RN on 7/30/2020 at 7:32 AM    7/30/2020 0141 by Stiven Gilmore RN  Outcome: Ongoing  Note: Patient will show no infection signs and symptoms.       Problem: Pain - Acute:  Goal: Pain level will decrease  Description: Pain level will decrease  7/30/2020 1300 by Gloria Morillo RN  Outcome: Completed  7/30/2020 0731 by Alma Martin RN  Outcome: Ongoing  Note: Pain level will decrease. Electronically signed by Alma Martin RN on 7/30/2020 at 7:32 AM    7/30/2020 0141 by Cecilia Price RN  Outcome: Ongoing  Note: Patients pain level will decrease. Problem: Skin Integrity:  Goal: Demonstration of wound healing without infection will improve  7/30/2020 1300 by Alma Martin RN  Outcome: Completed  7/30/2020 0731 by Alma Martin RN  Outcome: Ongoing  Note: Demonstration of wound healing without infection will improve. Electronically signed by Alma Martin RN on 7/30/2020 at 7:32 AM    7/30/2020 0141 by Cecilia Price RN  Outcome: Ongoing     Problem: Pain:  Goal: Pain level will decrease  Description: Pain level will decrease  7/30/2020 1300 by Alma Martin RN  Outcome: Completed  7/30/2020 0731 by Alma Martin RN  Outcome: Ongoing  Note: Pain level will decrease. Electronically signed by Alma Martin RN on 7/30/2020 at 7:32 AM    7/30/2020 0141 by Cecilia Price RN  Outcome: Ongoing  Note: Patients pain level will decrease. Goal: Control of acute pain  Description: Control of acute pain  7/30/2020 1300 by Alma Martin RN  Outcome: Completed  7/30/2020 0731 by Alma Martin RN  Outcome: Ongoing  Note: Control of acute pain. Electronically signed by Alma Martin RN on 7/30/2020 at 7:32 AM    7/30/2020 0141 by Cecilia Price RN  Outcome: Ongoing  Note: Patient will have control of acute pain. Goal: Control of chronic pain  Description: Control of chronic pain  7/30/2020 1300 by Alma Martin RN  Outcome: Completed  7/30/2020 0731 by Alma Martin RN  Outcome: Ongoing  Note: Control of chronic pain. Electronically signed by Alma Martin RN on 7/30/2020 at 7:33 AM    7/30/2020 0141 by Cecilia Price RN  Outcome: Ongoing  Note: Patient will have control of chronic pain.       Problem: Falls - Risk of:  Goal: Will remain free from falls  Description:

## 2020-07-30 NOTE — PROGRESS NOTES
Patient resting in chair upon assessment. Vital signs stable. Patient has been having urine incontinence when ambulating to bathroom, up to bathroom with this nurse x1 assist and tolerating well. Pain is controlled at this time, just c/o mild discomfort. Incentive spirometer done, reaching 1500ml, tolerating well. IV abx running now, TESS hose on, DVT boots on. Ice pack applied to LUE/shoulder. Patient refusing ranexa tonight, call light in reach, will continue to monitor.

## 2020-07-30 NOTE — PROGRESS NOTES
CLINICAL PHARMACY NOTE: MEDS TO [facility] Patient?: No  Total # of Prescriptions Filled: 2   The following medications were delivered to the patient:  Current Discharge Medication List      START taking these medications    Details   HYDROcodone-acetaminophen (NORCO) 5-325 MG per tablet Take 1-2 tablets by mouth every 6 hours as needed for Pain for up to 5 days.   Qty: 40 tablet, Refills: 0    Comments: Reduce doses taken as pain becomes manageable  Associated Diagnoses: Arthritis of left shoulder region         · Aspirin 81 EC    Total # of Interventions Completed: 0  Time Spent (min): 30    Additional Documentation:

## 2020-07-30 NOTE — PROGRESS NOTES
Pt dc'd home, instructions reviewed with [facility] RN, iv dc'd jorge intact, daughter here to take pt home, transported via wc.   Electronically signed by Kristofer Painter RN on 7/30/2020 at 1:08 PM

## 2020-07-30 NOTE — PROGRESS NOTES
Occupational Therapy  Facility/Department: [address] ORTHOPEDICS  Daily Treatment Note  NAME: Stan Fabry  :   MRN: [id number]    Date of Service: 2020    Discharge Recommendations:  Home with assist PRN, 24 hour supervision or assist       Assessment   Performance deficits / Impairments: Decreased functional mobility ; Decreased balance;Decreased ADL status; Decreased endurance;Decreased strength  Assessment: Discussed am pac score of 18 with OT. Patient reported son and daughter in law (both living with patient) will provide assistance at home with dressing and additional ADL tasks. Patient completed functional mobility with SBA using no device and no LOB noted. Patient min A with UE/LE dressing. Patient educated on donning different types of shirts. Patient completed codman exercises and provided a handout. Patient declined tolieting and bathing at this time. Continue with POC. OT Education: OT Role;Plan of Care;ADL Adaptive Strategies;Transfer Training;Precautions; Home Exercise Program  Patient Education: ice therapy, naina hose, education on codman exercises, dressing techniques  REQUIRES OT FOLLOW UP: No  Activity Tolerance  Activity Tolerance: Patient Tolerated treatment well  Safety Devices  Safety Devices in place: Yes  Type of devices: Left in chair;Chair alarm in place;Nurse notified;Call light within reach;Gait belt         Patient Diagnosis(es): The encounter diagnosis was Arthritis of left shoulder region. has a past medical history of Arthritis, CAD (coronary artery disease), Cerebral artery occlusion with cerebral infarction ([address] Hiatal hernia, History of blood transfusion, Hyperlipidemia, Hypertension, Kidney disease, chronic, stage III (GFR 30-59 ml/min) ([address] Paraphasia, PBC (primary biliary cirrhosis), Thyroid disease, and Wears glasses. has a past surgical history that includes Tonsillectomy (); Hemorrhoid surgery ();  Hysterectomy, total abdominal (2001); Bunionectomy (10/2001); Bladder surgery (1/2007); Cystoscopy (6/2008); hernia repair (4/2007); brain surgery (07/2004); eye surgery (Right, 11/04/2014); Cardiac surgery (3/2010, 10/2010); Coronary angioplasty (4/2002, 3/2003, 1/2011, 8/11/15); skin biopsy (04/2005); Abdomen surgery; shoulder surgery (09/14/2016); Colonoscopy; joint replacement (10/30/2013); and joint replacement (Left). Restrictions  Restrictions/Precautions  Restrictions/Precautions: Fall Risk, Weight Bearing  Upper Extremity Weight Bearing Restrictions  Left Upper Extremity Weight Bearing: Non Weight Bearing  Position Activity Restriction  Other position/activity restrictions: LUE NWBing in sling  Subjective   General  Chart Reviewed: Yes  Patient assessed for rehabilitation services?: Yes  Additional Pertinent Hx: Pt is 77 y.o. F who presents with arthritis of L shoulder. Pt is s/p L reverse total shoulder replacement and is NWBing in sling. PMH: CAD, CVA, primary biliary cirrhosis, CKD, HTN  Response to previous treatment: Patient with no complaints from previous session  Family / Caregiver Present: No  Referring Practitioner: Chrystal Nicholas MD  Diagnosis: Arthritis of L shoulder  Subjective  Subjective: Patient supine in bed upon arrival to room. Patient agreeable to OT. Orientation  Orientation  Overall Orientation Status: Within Functional Limits  Objective    ADL  Grooming: Stand by assistance(Patient standing at sink washing face)  UE Dressing: Minimal assistance(Patient seated in chair and educated on donning shirt using LUE first. Patient able to insert RUE and pull shirt down.)  LE Dressing: Minimal assistance(Patient seated in chair and required assistance for threading pants with LLE. Patient able to pull pants up.)  Additional Comments: Patient SBA with grooming at the sinkl. Patient declined tolieting and bathing at this time. Patient required min A with LE/UE dressing.  Patient required cues on NWB while completing ADL Goals   Patient goals : \"to go home tomorrow\"       Therapy Time   Individual Concurrent Group Co-treatment   Time In 9966         Time Out 0845         Minutes 50         If discharged prior to next OT session please see last daily note for discharge status    I attest that I was present for and made a skilled and mindful clinical judgement during the treatment of this patient 7/30/2020        Rik Moreno S/TIFFANIE

## 2020-07-30 NOTE — PROGRESS NOTES
[facility] Orthopedic Surgery   Progress Note      S/P :  SUBJECTIVE  Patient is alert and oriented, sitting up in bed. Pain is   described in Left Shoulder and with the intensity of mild. Pain is described as 3 out of 10. OBJECTIVE              Physical                      VITALS:  BP (!) 100/58   Pulse 70   Temp 97.5 °F (36.4 °C) (Oral)   Resp 14   Ht 5' (1.524 m)   Wt 143 lb 11.8 oz (65.2 kg)   SpO2 94%   BMI 28.07 kg/m²                     MUSCULOSKELETAL:  left hand NVI. Wiggles fingers to command. Radial pulses are palpable. NEUROLOGIC:                                  Sensory:  Touch:  Left Upper Extremity:  normal                                                 Surgical wound appears clean and intact, prineo dressing in place. Arm in sling.  Ice applied to shoulder    Data       CBC:   Lab Results   Component Value Date    WBC 6.4 07/23/2020    RBC 3.51 07/23/2020    HGB 10.2 07/30/2020    HCT 30.0 07/30/2020    .0 07/23/2020    MCH 34.2 07/23/2020    MCHC 34.1 07/23/2020    RDW 13.5 07/23/2020     07/23/2020    MPV 9.5 07/23/2020        WBC:    Lab Results   Component Value Date    WBC 6.4 07/23/2020        Hemoglobin/Hematocrit:    Lab Results   Component Value Date    HGB 10.2 07/30/2020    HCT 30.0 07/30/2020        PT/INR:    Lab Results   Component Value Date    PROTIME 10.7 07/23/2020    INR 0.92 07/23/2020              Current Inpatient Medications             Current Facility-Administered Medications: calcium elemental (OSCAL) tablet 500 mg, 500 mg, Oral, Daily  famotidine (PEPCID) tablet 40 mg, 40 mg, Oral, Daily  ferrous sulfate EC tablet 324 mg, 324 mg, Oral, BID WC  hydrALAZINE (APRESOLINE) tablet 25 mg, 25 mg, Oral, BID  ketotifen (ZADITOR) 0.025 % ophthalmic solution 1 drop, 1 drop, Both Eyes, BID  potassium chloride (KLOR-CON M) extended release tablet 20 mEq, 20 mEq, Oral, Daily  levothyroxine (SYNTHROID) tablet 25 mcg, 25 mcg, Oral, QAM AC  losartan (COZAAR) tablet 25 mg, 25 mg, Oral, Nightly  metoprolol tartrate (LOPRESSOR) tablet 100 mg, 100 mg, Oral, BID  omeprazole (PRILOSEC) delayed release capsule 20 mg, 20 mg, Oral, BID AC  ranolazine (RANEXA) extended release tablet 500 mg, 500 mg, Oral, BID  rosuvastatin (CRESTOR) tablet 10 mg, 10 mg, Oral, Nightly  zolpidem (AMBIEN) tablet 10 mg, 10 mg, Oral, Nightly PRN  ursodiol (ACTIGALL) capsule 300 mg, 300 mg, Oral, TID AC  sodium chloride flush 0.9 % injection 10 mL, 10 mL, Intravenous, 2 times per day  sodium chloride flush 0.9 % injection 10 mL, 10 mL, Intravenous, PRN  morphine (PF) injection 2 mg, 2 mg, Intravenous, Q3H PRN  sennosides-docusate sodium (SENOKOT-S) 8.6-50 MG tablet 1 tablet, 1 tablet, Oral, BID  magnesium hydroxide (MILK OF MAGNESIA) 400 MG/5ML suspension 30 mL, 30 mL, Oral, Daily PRN  ondansetron (ZOFRAN) injection 4 mg, 4 mg, Intravenous, Q6H PRN  insulin glargine (LANTUS) injection vial 16 Units, 0.25 Units/kg, Subcutaneous, Nightly  insulin lispro (HUMALOG) injection vial 5 Units, 0.08 Units/kg, Subcutaneous, TID WC  insulin lispro (HUMALOG) injection vial 0-6 Units, 0-6 Units, Subcutaneous, TID WC  insulin lispro (HUMALOG) injection vial 0-3 Units, 0-3 Units, Subcutaneous, Nightly  glucose (GLUTOSE) 40 % oral gel 15 g, 15 g, Oral, PRN  dextrose 50 % IV solution, 12.5 g, Intravenous, PRN  glucagon (rDNA) injection 1 mg, 1 mg, Intramuscular, PRN  dextrose 5 % solution, 100 mL/hr, Intravenous, PRN  acetaminophen (TYLENOL) tablet 650 mg, 650 mg, Oral, Q4H PRN  HYDROcodone-acetaminophen (NORCO) 5-325 MG per tablet 1 tablet, 1 tablet, Oral, Q4H PRN  HYDROcodone-acetaminophen (NORCO) 5-325 MG per tablet 2 tablet, 2 tablet, Oral, Q4H PRN  diphenhydrAMINE (BENADRYL) tablet 25 mg, 25 mg, Oral, Q6H PRN    ASSESSMENT AND PLAN      DVT prophylaxis ordered, aspirin 81 mg BID X 14 days at home  Reviewed with patient importance of TESS hose on daily/ off at  Night for 2 weeks as well and continue anticoagulant medication at home as ordered to reduce risk of postoperative DVT or PE clots. Pt verbalized understanding.   PT OT for ADL's and ambulation as tolerated, codman exercises BID  SS for DC planning is home today  IV or PO pain med as ordered      Lincoln Alfaro 89  7/30/2020  8:07 AM

## 2020-07-30 NOTE — OP NOTE
[address] [facility]                                OPERATIVE REPORT    PATIENT NAME: Maria uEgenia Cabello                    :        1945  MED REC NO:   [id number]                          ROOM:       3105  ACCOUNT NO:   [de-identified]                           ADMIT DATE: 2020  PROVIDER:     Sofiya Merino MD    DATE OF PROCEDURE:  2020    PREOPERATIVE DIAGNOSIS:  Degenerative arthritis of the left shoulder. POSTOPERATIVE DIAGNOSIS:  Degenerative arthritis of the left shoulder. OPERATION PERFORMED:  Left reverse total shoulder arthroplasty. SURGEON:  Sofiya Merino MD    ASSISTANT:  JONH Rosenberg    ESTIMATED BLOOD LOSS:  200 mL. INDICATIONS:  The patient is a 29-year-old female who presents with  severe degenerative osteoarthritis of the shoulder. She is status post  contralateral left reverse total shoulder arthroplasty about three years  ago. She is here for a left shoulder reconstruction. OPERATIVE PROCEDURE:  The patient was brought to the operating room. Once anesthetic was obtained and intravenous antibiotics delivered, she  was placed in the beach-chair position. Her left arm and shoulder were  prepped and draped in a sterile fashion. An anterior incision was made. Skin and subcutaneous tissue were  divided down to exploit the deltopectoral groove. The cephalic vein  along with the deltoid was retracted laterally. Dissection carried down  further onto the humeral and deep humeral anatomy. Biceps tendon was  not visualized or seen within the biceps groove. The subscapularis  muscle-tendon unit was peeled medially, exposing a highly arthritic  humeral head. A humeral head osteotomy was performed in 20 degrees of retroversion. The proximal humerus was rasped to accept a #2 long Flex stem. This  then was covered with a metal plate and the glenoid was exposed. Dissection was carried down around the glenoid in a 360-degree fashion,  releasing all the glenohumeral, labral, and ligamentous attachments  including inferiorly to the origin of the long head of the triceps. Once the glenoid was exposed, we used the preoperative advanced imaging  Blueprint to identify and find the center of the glenoid neck. A  Steinmann pin was placed down the center of the neck and then checked  with C-arm to check to see if it was in good position, happy with its  overall orientation and alignment. We proceeded to place a 25-mm standard baseplate. This was held down  with one 30-mm screw centrally and then two 30-mm screws placed at the  12 o'clock and 6 o'clock positions. This was confirmed in good position  with the use of the C-arm. The 36-mm glenosphere was then placed over  the glenoid baseplate and screwed into place, again checking with C-arm  to be in good position. The proximal humerus then was exposed, and a  trial reduction was done with a #2 long Flex stem, the high-offset  baseplate, and the +6 mm poly. The shoulder was reduced and taken  through a stable range of motion. The shoulder then was dislocated with  great difficulty. The trial implant was removed and then reconstructed as the real  uncemented device on the back table. The #2 long Flex stem along with  the high-offset baseplate was hammered into place in 20 degrees of  retroversion. The +6 poly was placed on the superior surface of the  humeral implant and the shoulder was reduced. The arm was taken through  a full and stable range of motion. Finally, C-arm was brought in to  inspect the implant, the baseplate, baseplate screws, glenoid, and stem,  all looked like in good position with no other fractures. The wound was irrigated out. Hemostasis was obtained. The wound was  injected with 100 mL of ropivacaine, morphine, cefuroxime, and  methylprednisolone.   It was also bathed for five minutes with 3 gm of  tranexamic acid and then finally aqueous iodine. Because the patient  was MRSA positive or her MRSA status was unknown, 1 gm of vancomycin was  sprinkled over the implant. The wound was closed in layers. The  patient tolerated the procedure well, and she was brought to the  recovery room after a dressing was applied and sling was applied in  stable condition. CORRIE Mcdermott MD    D: 07/29/2020 10:27:05       T: 07/29/2020 13:49:40     [email or web address]  Job#: [id number]     Doc#: [id number]    CC:

## 2020-07-30 NOTE — DISCHARGE INSTR - COC
Continuity of Care Form    Patient Name: Sarah Chambers   :    MRN:  [id number]    Admit date:  2020  Discharge date:  ***    Code Status Order: Full Code   Advance Directives:   Advance Care Flowsheet Documentation     Date/Time Healthcare Directive Type of Healthcare Directive Copy in [address] Agent's Name Healthcare Agent's Phone Number    20 1340  No, patient does not have an advance directive for healthcare treatment -- -- -- -- --    20 0927  No, patient does not have an advance directive for healthcare treatment -- -- -- -- --          Admitting Physician:  Luke Nunez MD  PCP: [facility]    Discharging Nurse: [facility] Unit/Room#: U5A-5505/7004-79  Discharging Unit Phone Number: ***    Emergency Contact:   Extended Emergency Contact Information  Primary Emergency Contact: [address] [place] [address] Phone: [phone]  Relation: Child  Secondary Emergency Contact: [address] [place] [address] Phone: [phone]  Relation: Child    Past Surgical History:  Past Surgical History:   Procedure Laterality Date    ABDOMEN SURGERY      lap band    BLADDER SURGERY  2007   Mica Latin BRAIN SURGERY  2004    @ [facility]--to improve blood flow to brain    BUNIONECTOMY  10/2001   [address]  3/2010, 10/2010    5 vessel    COLONOSCOPY      CORONARY ANGIOPLASTY  2002, 3/2003, 2011, 8/11/15    6 stents    CYSTOSCOPY  2008    EYE SURGERY Right 2014    cataract removal    HEMORRHOID SURGERY  1989    HERNIA REPAIR  2007    hiatal hernia    HYSTERECTOMY, TOTAL ABDOMINAL  2001    JOINT REPLACEMENT  10/30/2013    right total knee    JOINT REPLACEMENT Left     SHOULDER SURGERY  2016    R REVERSE TSA WITH BICEPS TENODESIS    SKIN BIOPSY  2005    from neck area   [address] [place] [address] [address]       Immunization History:      There is no immunization history on file for this patient. Active Problems:  Patient Active Problem List   Diagnosis Code    Arthritis of right shoulder region M19.011    Biceps tendonitis on right M75.21    S/p reverse total shoulder arthroplasty Z96.619    Other specified arthritis, left shoulder M13.812       Isolation/Infection:   Isolation          No Isolation        Patient Infection Status     None to display          Nurse Assessment:  Last Vital Signs: BP (!) 100/58   Pulse 70   Temp 97.5 °F (36.4 °C) (Oral)   Resp 14   Ht 5' (1.524 m)   Wt 143 lb 11.8 oz (65.2 kg)   SpO2 94%   BMI 28.07 kg/m²     Last documented pain score (0-10 scale): Pain Level: 0  Last Weight:   Wt Readings from Last 1 Encounters:   20 143 lb 11.8 oz (65.2 kg)     Mental Status:  {IP PT MENTAL STATUS:}    IV Access:  { ANTWAN IV ACCESS:290347863}    Nursing Mobility/ADLs:  Walking   {[facility] DME HCFO:579592853}  Transfer  {[facility] DME TRK}  Bathing  {[facility] DME JWPL:061490125}  Dressing  {[facility] DME FLLK:527659090}  Toileting  {[facility] DME QVKN:094740412}  Feeding  {[facility] DME PV}  Med Admin  {[facility] DME UJXS:456847113}  Med Delivery   { ANTWAN MED Delivery:807092377}    Wound Care Documentation and Therapy:  Incision 16 Shoulder Right (Active)   Number of days: 1414        Elimination:  Continence:   · Bowel: {YES / KL:43423}  · Bladder: {YES / TB:61305}  Urinary Catheter: {Urinary Catheter:921635569}   Colostomy/Ileostomy/Ileal Conduit: {YES / KV:83731}       Date of Last BM: ***    Intake/Output Summary (Last 24 hours) at 2020 0816  Last data filed at 2020 0629  Gross per 24 hour   Intake 1340 ml   Output 1500 ml   Net -160 ml     I/O last 3 completed shifts: In: 4697 [P.O.:240;  I.V.:1100]  Out: 1500 [Urine:1300; Blood:200]    Safety Concerns:     [facility] ANTWAN Safety Concerns:711562109}    Impairments/Disabilities:      [address] Impairments/Disabilities:357158626}    Nutrition Therapy:  Current Nutrition Therapy:   [address] Diet

## 2020-08-07 NOTE — DISCHARGE SUMMARY
Physician Discharge Summary     Patient ID:  Susy Alcala  [id number]  67 y.o.  1945    Admit date: 7/29/2020    Discharge date and time: 7/30/2020 12:53 PM     Admitting Physician: Genny Edwards MD     Discharge Physician: Humberto Peña    Admission Diagnoses: Arthritis of left shoulder region [M19.012]  Other specified arthritis, left shoulder [M13.812]    Discharge Diagnoses: left shoulder OA    Admission Condition: good    Discharged Condition: good    Indication for Admission: Failed conservative treatment as outpatient for joint pain including PT and pain meds. This patient was then electively scheduled for total joint replacement surgery    Surgical procedure: left reverse TSA    Consults: PT OT SS    This patient had no postoperative complications. They has PT and OT for ADL's . IV and PO pain med for pain control and was eventually DC in stable condition    Treatments: analgesia,  therapies: PT OT,  and surgery      Disposition: home    Patient Instructions:   [unfilled]  Activity: activity as tolerated  Diet: regular diet  Wound Care: keep wound clean and dry    Follow-up with Humberto Peña in 2 weeks.     Signed:  Raylene Cranker  8/7/2020  11:59 AM

## 2020-08-10 ENCOUNTER — OFFICE VISIT (OUTPATIENT)
Dept: ORTHOPEDIC SURGERY | Age: 75
End: 2020-08-10

## 2020-08-10 ENCOUNTER — TELEPHONE (OUTPATIENT)
Dept: ORTHOPEDICS UNIT | Age: 75
End: 2020-08-10

## 2020-08-10 VITALS — TEMPERATURE: 97.3 F | WEIGHT: 143 LBS | HEIGHT: 60 IN | BODY MASS INDEX: 28.07 KG/M2 | RESPIRATION RATE: 16 BRPM

## 2020-08-10 PROCEDURE — 99024 POSTOP FOLLOW-UP VISIT: CPT | Performed by: ORTHOPAEDIC SURGERY

## 2020-08-10 NOTE — PROGRESS NOTES
This dictation was done with [employer] dictation and may contain mechanical errors related to translation. Temperature 97.3 °F (36.3 °C), temperature source Infrared, resp. rate 16, height 5' (1.524 m), weight 143 lb (64.9 kg), not currently breastfeeding.

## 2020-08-12 RX ORDER — HYDROCODONE BITARTRATE AND ACETAMINOPHEN 5; 325 MG/1; MG/1
1-2 TABLET ORAL EVERY 6 HOURS PRN
Qty: 40 TABLET | Refills: 0 | Status: SHIPPED | OUTPATIENT
Start: 2020-08-12 | End: 2020-08-17

## 2020-08-15 NOTE — PROGRESS NOTES
Patient is a 71-year-old female status post left reverse total shoulder arthroplasty on 7/29/2020. She is here for her first postoperative evaluation appears to be doing reasonably well. Physical examination 71-year-old female oriented x3 her temperature is 97.3. Examination of the left shoulder shows a healing anterior wound. No signs of drainage or deep sepsis noted in the left shoulder. Decreased range of motion of the shoulder however no obvious signs of instability. Axillary motor and sensory intact of the left upper extremity. Distal neurovascular examinations intact to the left hand and fingers. X-rays obtained AP axillary and lateral view of the left shoulder show status post uncemented left reverse total shoulder arthroplasty. Stable overall orientation and alignment with no obvious signs of loosening hardware failure or fracture. Patient is status post open chest procedure as wires can be seen in her sternum. No other obvious fractures tumors or dislocations are noted on these x-rays. Impression 71-year-old female stable status post left reverse total shoulder arthroplasty. Plan we will start her physical therapy about 3 to 4 weeks postop and follow-up in 6 weeks or PRN.

## 2020-09-21 ENCOUNTER — OFFICE VISIT (OUTPATIENT)
Dept: ORTHOPEDIC SURGERY | Age: 75
End: 2020-09-21

## 2020-09-21 VITALS — TEMPERATURE: 97.7 F | HEIGHT: 60 IN | BODY MASS INDEX: 27.48 KG/M2 | WEIGHT: 140 LBS

## 2020-09-21 PROCEDURE — 99024 POSTOP FOLLOW-UP VISIT: CPT | Performed by: ORTHOPAEDIC SURGERY

## 2020-09-21 NOTE — PROGRESS NOTES
This dictation was done with [employer] dictation and may contain mechanical errors related to translation. Temperature 97.7 °F (36.5 °C), height 5' (1.524 m), weight 140 lb (63.5 kg), not currently breastfeeding.

## 2020-09-27 NOTE — PROGRESS NOTES
Patient is a 41-year-old female status post left reverse total shoulder arthroplasty on 7/29/2020. This patient comes in today is having some increasing pain. She determined by herself without my recommendation not to do any physical therapy. She is now about 2 months postop and is rating her pain at 4 out of 10. She has had x-rays which show stable uncemented reverse total shoulder arthroplasty performed on her left shoulder. Physical examination 41-year-old female oriented x3 temperature is 97.7. Examination of the left shoulder shows well-healed anterior wound no signs of deep sepsis. She has 90 degrees of abduction and 90 degrees of forward flexion with no signs of obvious instability or deep sepsis. She does complain of a pinching pain in the anterior aspect of her shoulder. Distal neurovascular examinations intact to the left hand and fingers. Axillary motor and sensory intact to the left upper extremity. X-rays in the past have shown stable uncemented reverse total shoulder arthroplasty. Impression 41-year-old female now about 2 months postop reverse total shoulder arthroplasty. Patient on her own decided she did not need therapy and I think that was a mistake on her part. Plan start physical therapy follow-up in 4 to 6 weeks or PRN. No x-rays needed at that time.

## 2020-10-08 ENCOUNTER — TELEPHONE (OUTPATIENT)
Dept: ORTHOPEDIC SURGERY | Age: 75
End: 2020-10-08

## 2020-11-03 ENCOUNTER — OFFICE VISIT (OUTPATIENT)
Dept: ORTHOPEDIC SURGERY | Age: 75
End: 2020-11-03
Payer: MEDICARE

## 2020-11-03 VITALS — TEMPERATURE: 97.2 F

## 2020-11-03 PROBLEM — Z96.612 S/P REVERSE TOTAL SHOULDER ARTHROPLASTY, LEFT: Status: ACTIVE | Noted: 2020-11-03

## 2020-11-03 PROCEDURE — 1090F PRES/ABSN URINE INCON ASSESS: CPT | Performed by: PHYSICIAN ASSISTANT

## 2020-11-03 PROCEDURE — 4040F PNEUMOC VAC/ADMIN/RCVD: CPT | Performed by: PHYSICIAN ASSISTANT

## 2020-11-03 PROCEDURE — 99213 OFFICE O/P EST LOW 20 MIN: CPT | Performed by: PHYSICIAN ASSISTANT

## 2020-11-03 PROCEDURE — G8417 CALC BMI ABV UP PARAM F/U: HCPCS | Performed by: PHYSICIAN ASSISTANT

## 2020-11-03 PROCEDURE — G8400 PT W/DXA NO RESULTS DOC: HCPCS | Performed by: PHYSICIAN ASSISTANT

## 2020-11-03 PROCEDURE — 1123F ACP DISCUSS/DSCN MKR DOCD: CPT | Performed by: PHYSICIAN ASSISTANT

## 2020-11-03 PROCEDURE — G8484 FLU IMMUNIZE NO ADMIN: HCPCS | Performed by: PHYSICIAN ASSISTANT

## 2020-11-03 PROCEDURE — 3017F COLORECTAL CA SCREEN DOC REV: CPT | Performed by: PHYSICIAN ASSISTANT

## 2020-11-03 PROCEDURE — 1036F TOBACCO NON-USER: CPT | Performed by: PHYSICIAN ASSISTANT

## 2020-11-03 PROCEDURE — G8427 DOCREV CUR MEDS BY ELIG CLIN: HCPCS | Performed by: PHYSICIAN ASSISTANT

## 2020-11-03 NOTE — PROGRESS NOTES
SURGEON:  Manoj Aguirre MD  COSURGEON:  None.  DATE OF SURGERY:  TBD  START TIME:  tbd  DIAGNOSIS:    1. Arm mass, left    2. Arm mass, right    3. Mass of thigh, right        PROCEDURE:  Excision of multiple masses Left posterior upper arm, Left anterior forearm, Right posterior upper arm, Right anterior thigh  LENGTH:  90 min (1.5 hours)  ANESTHESIA: Local injection  POSITION:  supine  SPECIAL EQUIPMENT/NEEDS:    None.    HISTORY AND PHYSICAL PHYSICIAN:  None  TYPE OF ADMIT:  Ambulatory  PREOP ANTIBIOTICS: none  DAY OF LABS:  Per anesthesia   BLOOD:  None.  OTHER ORDERS:  Bilateral SCDs on call to the OR.  Consent will be signed the day of surgery.    ALLERGIES:  No Known Allergies         SURGERY CONSULT NOTE    REQUESTING PROVIDER:  Bishop Rios DO    CHIEF COMPLAINT:  Mass of left upper arm     HISTORY OF PRESENT ILLNESS:    Rubens Frias is a 70 year old male presents for evaluation of mass on his left upper arm. Patient reports the mass has been there for many years. It has grown and can be tender when touched and when he plays hockey. Patient denies any infection or inflammation of the mass. No drainage. No injury or trauma. Patient denies, swelling, redness, warmth on palpation, bleeding, fever, chills, has been feeling well.    He also reports 3 other masses, one one his left forearm, one on his right posterior upper arm with sporadic tenderness, and one on his right anterior thigh.        HISTORIES:  ALLERGIES:  No Known Allergies  Current Outpatient Prescriptions   Medication Sig Dispense Refill   • meloxicam (MOBIC) 15 MG tablet TAKE 1 TABLET BY MOUTH DAILY 90 tablet 1     No current facility-administered medications for this visit.      Past Medical History:   Diagnosis Date   • Abnormal x-ray of lung     left side, scaring, from perontinitis   • Health care maintenance 1/15/2014    POA at home, will bring in   • HTN (hypertension)     off BP medication, normal 2/2015   • Medicare annual wellness  Subjective:      Patient ID: Radha Bird is a 77 y.o.  female. Chief Complaint   Patient presents with    Shoulder Problem     Left reverse TSR 7.29.2020        HPI: She is here for follow-up. Status post reverse shoulder arthroplasty performed 7/29/2020. She has been through outpatient therapy. Left shoulder is doing great, no issues or concerns. She is also status post right reverse shoulder arthroplasty which is doing well. Review of Systems:   Negative for fever or chills. Negative for numbness or tingling in the upper extremity.     Past Medical History:   Diagnosis Date    Arthritis     CAD (coronary artery disease)     Cerebral artery occlusion with cerebral infarction ([facility] [address] 2003-NOV,DEC    CVA and TIA r/t blood clots-APHASIA    Hiatal hernia     History of blood transfusion     twice during CABG    Hyperlipidemia     Hypertension     Kidney disease, chronic, stage III (GFR 30-59 ml/min)     Paraphasia     PBC (primary biliary cirrhosis)     Thyroid disease     Wears glasses        Family History   Problem Relation Age of Onset    Stroke Mother     Heart Disease Mother        Past Surgical History:   Procedure Laterality Date    ABDOMEN SURGERY      lap band    BLADDER SURGERY  1/2007    BRAIN [facility]  07/2004    [facility]--to improve blood flow to brain    BUNIONECTOMY  10/2001   [address]  3/2010, 10/2010    5 vessel    COLONOSCOPY      CORONARY ANGIOPLASTY  4/2002, 3/2003, 1/2011, 8/11/15    6 stents    CYSTOSCOPY  6/2008    EYE SURGERY Right 11/04/2014    cataract removal    HEMORRHOID SURGERY  1989    HERNIA REPAIR  4/2007    hiatal hernia    HYSTERECTOMY, TOTAL ABDOMINAL  9/2001    JOINT REPLACEMENT  10/30/2013    right total knee    JOINT REPLACEMENT Left     SHOULDER SURGERY  09/14/2016    R REVERSE TSA WITH BICEPS TENODESIS    SHOULDER SURGERY Left 7/29/2020    LEFT REVERSE TOTAL SHOULDER REPLACEMENT performed by Miroslava Krishnan MD at [facility] OR    SKIN BIOPSY  04/2005    from neck area   [address] [place] [address] [address]       Social History     Occupational History    Not on file   Tobacco Use    Smoking status: Never Smoker    Smokeless tobacco: Never Used   Substance and Sexual Activity    Alcohol use: No     Alcohol/week: 0.0 standard drinks    Drug use: Never    Sexual activity: Not Currently       Current Outpatient Medications   Medication Sig Dispense Refill    valsartan (DIOVAN) 80 MG tablet Take 80 mg by mouth nightly       aspirin 81 MG tablet Take 1 tablet by mouth daily for 14 days Take twice a day for 14 days after shoulder surgery for DVT blood clot prophylaxis then resume daily dosing.  28 tablet 0    ranolazine (RANEXA) 500 MG extended release tablet Take 500 mg by mouth 2 times daily      ketotifen (THERATEARS ALLERGY) 0.025 % ophthalmic solution Place 1 drop into both eyes as needed THERATEARS      Multiple Vitamins-Minerals (PRORENAL QD PO) Take 1 tablet by mouth daily      omeprazole (PRILOSEC) 20 MG delayed release capsule Take 20 mg by mouth 2 times daily       famotidine (PEPCID) 40 MG tablet Take 40 mg by mouth daily      EPINEPHrine (EPIPEN) 0.3 MG/0.3ML SOAJ injection Inject 0.3 mg into the muscle      rosuvastatin (CRESTOR) 10 MG tablet Take 10 mg by mouth nightly       ticagrelor (BRILINTA) 60 MG TABS tablet Take 60 mg by mouth 2 times daily       KLOR-CON M20 20 MEQ tablet Take 20 mEq by mouth daily       metoprolol (LOPRESSOR) 100 MG tablet Take 100 mg by mouth 2 times daily       cephALEXin (KEFLEX) 500 MG capsule Take 500 mg by mouth daily Frequent UTI      levothyroxine (SYNTHROID) 25 MCG tablet Take 25 mcg by mouth daily       ursodiol (ACTIGALL) 300 MG capsule Take 300 mg by mouth 3 times daily (before meals)       zolpidem (AMBIEN) 10 MG tablet Take by mouth nightly       penicillin v potassium (VEETID) 500 MG tablet Take 500 mg by mouth 4 times daily      ferrous sulfate 325 (65 FE) MG tablet Take visit, subsequent 2/9/2015   • Polyp of colon 5/26/2006    3, tubular adenoma     Past Surgical History:   Procedure Laterality Date   • Appendectomy  01/01/1958    peritonitis and scarring of left lower lung   • Colonoscopy diagnostic  04/15/2013    Diverticulosis. Dr. May, no polyps found on repeat.  patient wishes to wait 10 yrs   • Colonoscopy w biopsy  5/26/2006   • Knee scope,abrasn arthroplasty      ACL damage.  Still plays hockey   • Shoulder surgery      right, dislocation     History   Smoking Status   • Never Smoker   Smokeless Tobacco   • Never Used     History   Alcohol Use   • 1.0 oz/week   • 2 drink(s) per week       REVIEW OF SYSTEMS:    Constitutional:  Patient denies fever, chills, fatigue or malaise.    Eyes:  Denies change in visual acuity, pain, burning or itching.     ENT: Denies hearing loss, ear pain, nasal obstruction or discharge, sore throat, neck tenderness    Respiratory:  Denies cough, shortness of breath.    Cardiovascular:  Denies chest pain, palpitations, edema.    GI:  Denies abdominal pain, nausea, vomiting, bloody stools or diarrhea.   :  Denies urine retention, painful urination, urinary frequency, blood in urine or nocturia.    Integument:  Denies rash, itching. Mass left upper arm and forearm, right arm, right thigh.      PHYSICAL EXAM:    Vital Signs:    Visit Vitals  BP (!) 170/98 (BP Location: [facility], Patient Position: Sitting, Cuff Size: Regular)   Pulse 56   Temp 98.3 °F (36.8 °C) (Oral)   Resp 16   Ht 5' 9.5\" (1.765 m)   Wt 88.1 kg   BMI 28.27 kg/m²     Constitutional:  The patient is alert, well groomed, and cooperative.  Integument:  Warm. Dry. No erythema. No rash. Left posterior arm mobile, lipomatous 3cm mass. No erythema or edema. Minimal tenderness with palpation.  Left anterior forearm mobile lipomatous 1cm mass, Right posterior arm mobile lipomatous [id number] mass, Right anterior thigh mobile lipomatous mass 1cm.  HENT:  Normocephalic. Atraumatic. PEERL.  325 mg by mouth 2 times daily       Cholecalciferol (VITAMIN D) 2000 UNITS CAPS capsule Take by mouth daily       Biotin (BIOTIN MAXIMUM STRENGTH) 10 MG tablet Take 10 mg by mouth daily      Multiple Vitamins-Minerals (PRORENAL QD PO) Take by mouth      calcium carbonate (OSCAL) 500 MG TABS tablet Take 500 mg by mouth daily       B Complex-C (SUPER B COMPLEX/VITAMIN C PO) Take 1 tablet by mouth       Ascorbic Acid (VITAMIN C) 500 MG CAPS Take 1 tablet by mouth daily        No current facility-administered medications for this visit. Objective:   She is alert, oriented x 3, pleasant, well nourished, developed and in no acute distress. Temp 97.2 °F (36.2 °C) (Temporal)      Examination of her left reverse shoulder arthroplasty:  No pain with active or passive range of motion. She has about 107 degrees of forward flexion and 160 degrees of abduction. Internal rotation to L4. She has good strength in the left upper extremity. Examination of the upper extremities are intact with sensation to light touch. Motor testing  5/5 in all major motor groups including hand intrinsics. Radial, Median and Ulnar nerves are intact. Gallo's Sign absent. Examination of the upper extremities shows intact perfusion to all extremities. No cyanosis. Digits are warm to touch. Capillary refill is less than 2 seconds. There is no edema noted. Examination of the skin over the upper extremities:  Reveals the skin to be intact without lacerations or abrasions. There are no significant erythema, rashes or skin lesions. X Rays: not performed in the office today:   Diagnosis:        ICD-10-CM    1. S/P reverse total shoulder arthroplasty, left-7.29.2020  R79.901         Assessment/ Plan:      The natural history of the patient's diagnosis as well as the treatment options were discussed in full and questions were answered. Risks and benefits of the treatment options also reviewed in detail. Sclera white. Conjunctivae without injections. Oropharynx pink and moist.   Neck: Supple. Trachea midline. No thyromegaly or goiter. No cervical, submandibular, or supraclavicular lymphadenopathy.  Cardiovascular:  Normal heart rate. Normal rhythm. No murmurs. No rubs. No gallops.    Respiratory:  Normal breath sounds. No respiratory distress. No wheezing, rhochi, rubs. No chest wall tenderness.  Gastrointestinal: Soft. Normoactive bowel sounds. No tenderness or guarding.     LABORATORY DATA:    Lab Results   Component Value Date    SODIUM 146 (H) 03/01/2018    POTASSIUM 4.3 03/01/2018    CHLORIDE 106 03/01/2018    CO2 30 03/01/2018    BUN 17 03/01/2018    CREATININE 0.79 03/01/2018    GLUCOSE 97 03/01/2018    WBC 7.3 03/01/2018    HCT 45.6 03/01/2018    HGB 14.8 03/01/2018     03/01/2018    AST 22 03/01/2018    GPT 38 03/01/2018    ALKPT 71 03/01/2018    BILIRUBIN 0.3 03/01/2018      TSH (mcUnits/mL)   Date Value   03/01/2018 1.433      Lab Results   Component Value Date    COL YELLOW 09/05/2015    UAPP CLEAR 09/05/2015    USPG 1.010 09/05/2015    UPH 7.0 09/05/2015    UPROT NEGATIVE 09/05/2015    UGLU NEGATIVE 09/05/2015    UKET NEGATIVE 09/05/2015    UBILI NEGATIVE 09/05/2015    URBC TRACE (A) 09/05/2015    UNITR NEGATIVE 09/05/2015    UROB 0.2 09/05/2015    UWBC NEGATIVE 09/05/2015          ASSESSMENT:    1. Arm mass, left    2. Arm mass, right    3. Mass of thigh, right        Patient presents for initial surgical evaluation of multiple masses. 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 Since they are continuing to slowly grow and have become more bothersome to the patient, I recommend excision. We will plan to do this at the [facility] under local anesthesia with Dr. Aguirre due to multiple incisions. The procedure was explained. The risks, benefits, and alternatives were discussed, including the risk of bleeding, infection, need for additional procedures, recurrence,  Clinically she is doing very well status post left reverse shoulder arthroplasty. Continue home exercise program for at least 1 year postop. Activity restrictions discussed in detail today. Follow Up: July 2021. She will need x-rays of both left and right shoulder at that time  Call or return to clinic prn if these symptoms worsen or fail to improve as anticipated. scarring, etc.    PLAN:     We will plan excision of multiple masses under local anesthesia by Dr. Aguirre at the [facility] at the patient's earliest convenience. Consent will be signed day of surgery. No pre-op needed as the surgery will be without any sedation. Patient advised to call if area has increased redness, pain, swelling, if fever or chills develop, drainage, or with any other concerns or questions.    The patient indicated understanding of the   diagnosis and agreed with the plan of care.       Supervising Physician: Dr. Manoj Aguirre

## 2021-07-26 ENCOUNTER — OFFICE VISIT (OUTPATIENT)
Dept: ORTHOPEDIC SURGERY | Age: 76
End: 2021-07-26
Payer: MEDICARE

## 2021-07-26 VITALS
HEIGHT: 60 IN | HEART RATE: 57 BPM | BODY MASS INDEX: 27.48 KG/M2 | SYSTOLIC BLOOD PRESSURE: 138 MMHG | WEIGHT: 140 LBS | DIASTOLIC BLOOD PRESSURE: 75 MMHG

## 2021-07-26 DIAGNOSIS — Z96.611 STATUS POST REVERSE TOTAL REPLACEMENT OF RIGHT SHOULDER: Primary | ICD-10-CM

## 2021-07-26 DIAGNOSIS — Z96.612 S/P REVERSE TOTAL SHOULDER ARTHROPLASTY, LEFT: ICD-10-CM

## 2021-07-26 PROCEDURE — 3017F COLORECTAL CA SCREEN DOC REV: CPT | Performed by: ORTHOPAEDIC SURGERY

## 2021-07-26 PROCEDURE — 1090F PRES/ABSN URINE INCON ASSESS: CPT | Performed by: ORTHOPAEDIC SURGERY

## 2021-07-26 PROCEDURE — 1123F ACP DISCUSS/DSCN MKR DOCD: CPT | Performed by: ORTHOPAEDIC SURGERY

## 2021-07-26 PROCEDURE — 1036F TOBACCO NON-USER: CPT | Performed by: ORTHOPAEDIC SURGERY

## 2021-07-26 PROCEDURE — 4040F PNEUMOC VAC/ADMIN/RCVD: CPT | Performed by: ORTHOPAEDIC SURGERY

## 2021-07-26 PROCEDURE — G8400 PT W/DXA NO RESULTS DOC: HCPCS | Performed by: ORTHOPAEDIC SURGERY

## 2021-07-26 PROCEDURE — 99213 OFFICE O/P EST LOW 20 MIN: CPT | Performed by: ORTHOPAEDIC SURGERY

## 2021-07-26 PROCEDURE — G8417 CALC BMI ABV UP PARAM F/U: HCPCS | Performed by: ORTHOPAEDIC SURGERY

## 2021-07-26 PROCEDURE — G8427 DOCREV CUR MEDS BY ELIG CLIN: HCPCS | Performed by: ORTHOPAEDIC SURGERY

## 2021-07-28 NOTE — PROGRESS NOTES
[facility]  Office Visit    Chief Complaint: Follow-up s/p bilateral reverse total shoulder arthroplasty    HPI:  Hossein Quintanilla is a 77 y.o. who is here in follow-up of bilateral reverse total shoulder arthroplasty performed by Dr. Deanna Bardales. Right shoulder was done in 2016 and left shoulder was done on July 29, 2020. Both shoulders are doing well and she is not having any pain. She is quite functional with both shoulders and is satisfied with the result. Patient Active Problem List   Diagnosis    Arthritis of right shoulder region    Biceps tendonitis on right    S/p reverse total shoulder arthroplasty    Other specified arthritis, left shoulder    S/P reverse total shoulder arthroplasty, left-7.29.2020       ROS:  Constitutional: denies fever, chills, weight loss  MSK: denies pain in other joints, muscle aches  Neurological: denies numbness, tingling, weakness    Exam:  Blood pressure 138/75, pulse 57, height 5' (1.524 m), weight 140 lb (63.5 kg), not currently breastfeeding. Appearance: sitting in exam room chair, appears to be in no acute distress, awake and alert  Resp: unlabored breathing on room air  Skin: warm, dry and intact with out erythema or significant increased temperature  BUE: Examination of the shoulder shows no gross defects. Forward flexion is 160 degrees, external rotation 15 degrees, internal rotation to lumbar spine. Sensation is intact light touch. Brisk capillary refill. 2+ radial pulses. Strength is 5/5 in all muscle groups. Imaging:  3 views of bilateral shoulders were performed and interpreted today. Significant for bilateral reverse total shoulder arthroplasty prosthesis in place with no signs of osteolysis, loosening, fracture, or dislocation.     Assessment:  S/p bilateral reverse total shoulder arthroplasty    Plan:  She is doing well now 1 year on the left and 5 years on the right out from bilateral reverse total shoulder arthroplasty. She will continue activity as tolerated. We discussed with the patient today the use of antibiotic prophylaxis prior to any dental procedures. We discussed that the antibiotic prophylaxis would be used to help prevent periprosthetic joint infections. If they were not offered antibiotics by the physician performing the procedure, they should contact our office that we may prescribe them antibiotics. Follow-up on an annual basis. Total time spent on today's encounter was at least 25 minutes. This time included reviewing prior notes, radiographs, and lab results when available, reviewing history obtained by medical assistant, performing history and physical exam, reviewing tests/radiographs with the patient, counseling the patient, ordering medications or tests, documentation in the electronic health record, and coordination of care. This dictation was done with Dragon dictation and may contain mechanical errors related to translation.

## 2022-08-19 ENCOUNTER — OFFICE (OUTPATIENT)
Dept: URBAN - METROPOLITAN AREA CLINIC 16 | Facility: CLINIC | Age: 77
End: 2022-08-19
Payer: COMMERCIAL

## 2022-08-19 VITALS — WEIGHT: 141.4 LBS | DIASTOLIC BLOOD PRESSURE: 80 MMHG | HEART RATE: 74 BPM | SYSTOLIC BLOOD PRESSURE: 118 MMHG

## 2022-08-19 DIAGNOSIS — K57.90 DIVERTICULOSIS OF INTESTINE, PART UNSPECIFIED, WITHOUT PERFO: ICD-10-CM

## 2022-08-19 DIAGNOSIS — K74.3 PRIMARY BILIARY CIRRHOSIS: ICD-10-CM

## 2022-08-19 PROCEDURE — 99203 OFFICE O/P NEW LOW 30 MIN: CPT | Performed by: INTERNAL MEDICINE

## 2023-02-08 ENCOUNTER — OFFICE (OUTPATIENT)
Dept: URBAN - METROPOLITAN AREA CLINIC 16 | Facility: CLINIC | Age: 78
End: 2023-02-08
Payer: COMMERCIAL

## 2023-02-08 VITALS
DIASTOLIC BLOOD PRESSURE: 78 MMHG | HEART RATE: 67 BPM | OXYGEN SATURATION: 99 % | WEIGHT: 143.6 LBS | SYSTOLIC BLOOD PRESSURE: 118 MMHG

## 2023-02-08 DIAGNOSIS — K74.3 PRIMARY BILIARY CIRRHOSIS: ICD-10-CM

## 2023-02-08 DIAGNOSIS — K57.90 DIVERTICULOSIS OF INTESTINE, PART UNSPECIFIED, WITHOUT PERFO: ICD-10-CM

## 2023-02-08 DIAGNOSIS — R85.89 OTHER ABNORMAL FINDINGS IN SPECIMENS FROM DIGESTIVE ORGANS A: ICD-10-CM

## 2023-02-08 DIAGNOSIS — K58.1 IRRITABLE BOWEL SYNDROME WITH CONSTIPATION: ICD-10-CM

## 2023-02-08 PROCEDURE — 99214 OFFICE O/P EST MOD 30 MIN: CPT | Performed by: INTERNAL MEDICINE

## 2023-04-28 ENCOUNTER — AMBULATORY SURGICAL CENTER (OUTPATIENT)
Dept: URBAN - METROPOLITAN AREA SURGERY 5 | Facility: SURGERY | Age: 78
End: 2023-04-28
Payer: MEDICARE

## 2023-04-28 VITALS
SYSTOLIC BLOOD PRESSURE: 161 MMHG | SYSTOLIC BLOOD PRESSURE: 110 MMHG | SYSTOLIC BLOOD PRESSURE: 122 MMHG | RESPIRATION RATE: 21 BRPM | SYSTOLIC BLOOD PRESSURE: 148 MMHG | OXYGEN SATURATION: 98 % | HEART RATE: 62 BPM | DIASTOLIC BLOOD PRESSURE: 83 MMHG | SYSTOLIC BLOOD PRESSURE: 98 MMHG | DIASTOLIC BLOOD PRESSURE: 68 MMHG | HEART RATE: 66 BPM | RESPIRATION RATE: 15 BRPM | DIASTOLIC BLOOD PRESSURE: 80 MMHG | HEART RATE: 64 BPM | RESPIRATION RATE: 18 BRPM | SYSTOLIC BLOOD PRESSURE: 98 MMHG | DIASTOLIC BLOOD PRESSURE: 52 MMHG | OXYGEN SATURATION: 98 % | SYSTOLIC BLOOD PRESSURE: 110 MMHG | OXYGEN SATURATION: 100 % | SYSTOLIC BLOOD PRESSURE: 155 MMHG | HEART RATE: 60 BPM | SYSTOLIC BLOOD PRESSURE: 155 MMHG | WEIGHT: 145 LBS | DIASTOLIC BLOOD PRESSURE: 54 MMHG | DIASTOLIC BLOOD PRESSURE: 66 MMHG | DIASTOLIC BLOOD PRESSURE: 80 MMHG | OXYGEN SATURATION: 96 % | DIASTOLIC BLOOD PRESSURE: 52 MMHG | HEART RATE: 62 BPM | HEART RATE: 64 BPM | SYSTOLIC BLOOD PRESSURE: 122 MMHG | DIASTOLIC BLOOD PRESSURE: 68 MMHG | RESPIRATION RATE: 17 BRPM | DIASTOLIC BLOOD PRESSURE: 83 MMHG | TEMPERATURE: 97.3 F | TEMPERATURE: 97.3 F | SYSTOLIC BLOOD PRESSURE: 161 MMHG | HEART RATE: 63 BPM | RESPIRATION RATE: 16 BRPM | DIASTOLIC BLOOD PRESSURE: 66 MMHG | RESPIRATION RATE: 17 BRPM | HEART RATE: 63 BPM | DIASTOLIC BLOOD PRESSURE: 54 MMHG | RESPIRATION RATE: 16 BRPM | HEART RATE: 66 BPM | RESPIRATION RATE: 15 BRPM | OXYGEN SATURATION: 100 % | HEART RATE: 60 BPM | RESPIRATION RATE: 12 BRPM | WEIGHT: 145 LBS | RESPIRATION RATE: 21 BRPM | HEART RATE: 56 BPM | DIASTOLIC BLOOD PRESSURE: 58 MMHG | RESPIRATION RATE: 18 BRPM | SYSTOLIC BLOOD PRESSURE: 148 MMHG | RESPIRATION RATE: 12 BRPM | DIASTOLIC BLOOD PRESSURE: 58 MMHG | HEART RATE: 56 BPM | OXYGEN SATURATION: 96 %

## 2023-04-28 DIAGNOSIS — Z12.11 ENCOUNTER FOR SCREENING FOR MALIGNANT NEOPLASM OF COLON: ICD-10-CM

## 2023-04-28 DIAGNOSIS — R19.5 OTHER FECAL ABNORMALITIES: ICD-10-CM

## 2023-04-28 PROCEDURE — G0121 COLON CA SCRN NOT HI RSK IND: HCPCS | Mod: KX | Performed by: INTERNAL MEDICINE

## 2024-05-21 ENCOUNTER — OFFICE (OUTPATIENT)
Dept: URBAN - METROPOLITAN AREA CLINIC 16 | Facility: CLINIC | Age: 79
End: 2024-05-21

## 2024-05-21 VITALS — DIASTOLIC BLOOD PRESSURE: 64 MMHG | WEIGHT: 150 LBS | SYSTOLIC BLOOD PRESSURE: 112 MMHG | HEART RATE: 63 BPM

## 2024-05-21 DIAGNOSIS — K74.3 PRIMARY BILIARY CIRRHOSIS: ICD-10-CM

## 2024-05-21 DIAGNOSIS — K58.1 IRRITABLE BOWEL SYNDROME WITH CONSTIPATION: ICD-10-CM

## 2024-05-21 PROCEDURE — 99214 OFFICE O/P EST MOD 30 MIN: CPT

## 2024-05-21 RX ORDER — URSODIOL 300 MG/1
CAPSULE ORAL
Qty: 270 | Refills: 3 | Status: ACTIVE

## 2025-05-29 ENCOUNTER — OFFICE (OUTPATIENT)
Dept: URBAN - METROPOLITAN AREA CLINIC 16 | Facility: CLINIC | Age: 80
End: 2025-05-29
Payer: MEDICARE

## 2025-05-29 VITALS
SYSTOLIC BLOOD PRESSURE: 114 MMHG | HEART RATE: 54 BPM | WEIGHT: 154 LBS | OXYGEN SATURATION: 97 % | RESPIRATION RATE: 16 BRPM | DIASTOLIC BLOOD PRESSURE: 82 MMHG

## 2025-05-29 DIAGNOSIS — R12 HEARTBURN: ICD-10-CM

## 2025-05-29 DIAGNOSIS — K58.1 IRRITABLE BOWEL SYNDROME WITH CONSTIPATION: ICD-10-CM

## 2025-05-29 DIAGNOSIS — K74.3 PRIMARY BILIARY CIRRHOSIS: ICD-10-CM

## 2025-05-29 PROCEDURE — 99213 OFFICE O/P EST LOW 20 MIN: CPT

## 2025-05-29 RX ORDER — URSODIOL 300 MG/1
CAPSULE ORAL
Qty: 270 | Refills: 3 | Status: ACTIVE

## 2025-05-29 RX ORDER — OMEPRAZOLE 40 MG/1
CAPSULE, DELAYED RELEASE ORAL
Qty: 90 | Refills: 3 | Status: ACTIVE
Start: 2025-05-29

## 2025-05-29 RX ORDER — SENNOSIDES 8.6 MG/1
TABLET ORAL
Qty: 60 | Refills: 5 | Status: ACTIVE
Start: 2025-05-29

## 2025-08-06 ENCOUNTER — OFFICE (OUTPATIENT)
Dept: URBAN - METROPOLITAN AREA CLINIC 18 | Age: 80
End: 2025-08-06
Payer: MEDICARE

## 2025-08-06 DIAGNOSIS — K74.3 PRIMARY BILIARY CIRRHOSIS: ICD-10-CM

## 2025-08-06 PROCEDURE — 76981 USE PARENCHYMA: CPT | Performed by: INTERNAL MEDICINE

## 2025-09-05 ENCOUNTER — OFFICE (OUTPATIENT)
Dept: URBAN - METROPOLITAN AREA CLINIC 16 | Facility: CLINIC | Age: 80
End: 2025-09-05
Payer: MEDICARE

## 2025-09-05 VITALS
WEIGHT: 161 LBS | RESPIRATION RATE: 16 BRPM | OXYGEN SATURATION: 97 % | DIASTOLIC BLOOD PRESSURE: 78 MMHG | SYSTOLIC BLOOD PRESSURE: 120 MMHG | HEART RATE: 66 BPM

## 2025-09-05 DIAGNOSIS — R12 HEARTBURN: ICD-10-CM

## 2025-09-05 DIAGNOSIS — R10.84 GENERALIZED ABDOMINAL PAIN: ICD-10-CM

## 2025-09-05 DIAGNOSIS — K58.1 IRRITABLE BOWEL SYNDROME WITH CONSTIPATION: ICD-10-CM

## 2025-09-05 DIAGNOSIS — K74.3 PRIMARY BILIARY CIRRHOSIS: ICD-10-CM

## 2025-09-05 PROCEDURE — 99213 OFFICE O/P EST LOW 20 MIN: CPT

## 2025-09-05 RX ORDER — PERPHENAZINE 8 MG
TABLET ORAL
Qty: 180 | Refills: 5 | Status: ACTIVE
Start: 2025-09-05

## (undated) DEVICE — SOLUTION PREP POVIDONE IOD FOR SKIN MUCOUS MEM PRIOR TO

## (undated) DEVICE — INTENDED FOR TISSUE SEPARATION, AND OTHER PROCEDURES THAT REQUIRE A SHARP SURGICAL BLADE TO PUNCTURE OR CUT.: Brand: BARD-PARKER ® STAINLESS STEEL BLADES

## (undated) DEVICE — GOWN,REINF,POLY,AURORA,XLNG/XXL,STRL: Brand: MEDLINE

## (undated) DEVICE — SOLUTION IV IRRIG POUR BRL 0.9% SODIUM CHL 2F7124

## (undated) DEVICE — CHLORAPREP 26ML ORANGE

## (undated) DEVICE — DRAPE C ARM UNIV W41XL74IN CLR PLAS XR VELC CLSR POLY STRP

## (undated) DEVICE — MERCY HEALTH WEST TURNOVER: Brand: MEDLINE INDUSTRIES, INC.

## (undated) DEVICE — SHEET,DRAPE,53X77,STERILE: Brand: MEDLINE

## (undated) DEVICE — PAD,NON-ADHERENT,3X8,STERILE,LF,1/PK: Brand: MEDLINE

## (undated) DEVICE — CANISTER, RIGID, 1200CC: Brand: MEDLINE INDUSTRIES, INC.

## (undated) DEVICE — COVER LT HNDL BLU PLAS

## (undated) DEVICE — KIT SHLDR STBL MARCO FOR SPIDER LIMB POS

## (undated) DEVICE — SYSTEM SKIN CLSR 22CM DERMBND PRINEO

## (undated) DEVICE — GLOVE,SURG,SENSICARE SLT,LF,PF,8: Brand: MEDLINE

## (undated) DEVICE — HYPODERMIC SAFETY NEEDLE: Brand: MAGELLAN

## (undated) DEVICE — GLOVE,SURG,SENSICARE SLT,LF,PF,8.5: Brand: MEDLINE

## (undated) DEVICE — DRAPE,SPLIT ,77X120: Brand: MEDLINE

## (undated) DEVICE — DUAL CUT SAGITTAL BLADE

## (undated) DEVICE — SYRINGE IRRIG 60ML SFT PLIABLE BLB EZ TO GRP 1 HND USE W/

## (undated) DEVICE — COVER,TABLE,77X90,STERILE: Brand: MEDLINE

## (undated) DEVICE — GLOVE SURG SZ 85 L12IN FNGR THK13MIL WHT ISOLEX POLYISOPRENE

## (undated) DEVICE — BASIC SINGLE BASIN 1-LF: Brand: MEDLINE INDUSTRIES, INC.

## (undated) DEVICE — SUTURE STRATAFIX SPRL SZ 2 0 L14IN ABSRB UD MH L36MM 1 2 CIR SXMD2B401

## (undated) DEVICE — Z DISCONTINUED USE 2716304 SUTURE STRATAFIX SPRL SZ 3-0 L12IN ABSRB UD FS-1 L24MM 3/8

## (undated) DEVICE — SYRINGE MED 30ML STD CLR PLAS LUERLOCK TIP N CTRL DISP

## (undated) DEVICE — TOTAL BASIC PK

## (undated) DEVICE — 3M™ IOBAN™ 2 ANTIMICROBIAL INCISE DRAPE 6640EZ: Brand: IOBAN™ 2

## (undated) DEVICE — INTENDED TO SUPPORT AND MAINTAIN THE POSITION OF AN ANESTHETIZED PATIENT DURING SURGERY: Brand: ERIN BEACH CHAIR FACE MASK

## (undated) DEVICE — NEEDLE HYPO 18GA L1.5IN THN WALL PIVOTING SHLD BVL ORIENTED

## (undated) DEVICE — ELECTRODE PT RET AD L9FT HI MOIST COND ADH HYDRGEL CORDED

## (undated) DEVICE — DECANTER BAG 9": Brand: MEDLINE INDUSTRIES, INC.

## (undated) DEVICE — SUTURE ETHBND EXCEL SZ 0 L18IN NONABSORBABLE GRN L26MM MO-6 CX45D

## (undated) DEVICE — SYRINGE MED 10ML LUERLOCK TIP W/O SFTY DISP